# Patient Record
Sex: FEMALE | Race: WHITE | Employment: PART TIME | URBAN - METROPOLITAN AREA
[De-identification: names, ages, dates, MRNs, and addresses within clinical notes are randomized per-mention and may not be internally consistent; named-entity substitution may affect disease eponyms.]

---

## 2018-12-31 LAB
C TRACH DNA SPEC QL PROBE+SIG AMP: NORMAL
HBV SURFACE AG SERPL QL IA: NON REACTIVE
HIV 1+2 AB+HIV1 P24 AG SERPL QL IA: NON REACTIVE
N GONORRHOEA DNA SPEC QL PROBE+SIG AMP: NORMAL
RUBELLA ABY IGG: NORMAL

## 2019-02-28 ENCOUNTER — OFFICE VISIT (OUTPATIENT)
Dept: MATERNAL FETAL MEDICINE | Facility: CLINIC | Age: 29
End: 2019-02-28
Attending: OBSTETRICS & GYNECOLOGY
Payer: COMMERCIAL

## 2019-02-28 ENCOUNTER — HOSPITAL ENCOUNTER (OUTPATIENT)
Dept: LAB | Facility: CLINIC | Age: 29
End: 2019-02-28
Attending: OBSTETRICS & GYNECOLOGY
Payer: COMMERCIAL

## 2019-02-28 ENCOUNTER — HOSPITAL ENCOUNTER (OUTPATIENT)
Dept: ULTRASOUND IMAGING | Facility: CLINIC | Age: 29
Discharge: HOME | End: 2019-02-28
Attending: OBSTETRICS & GYNECOLOGY | Admitting: OBSTETRICS & GYNECOLOGY
Payer: COMMERCIAL

## 2019-02-28 ENCOUNTER — PRE VISIT (OUTPATIENT)
Dept: MATERNAL FETAL MEDICINE | Facility: CLINIC | Age: 29
End: 2019-02-28

## 2019-02-28 DIAGNOSIS — O26.90 PREGNANCY RELATED CONDITION, ANTEPARTUM: ICD-10-CM

## 2019-02-28 DIAGNOSIS — O28.3 ABNORMAL PRENATAL ULTRASOUND: Primary | ICD-10-CM

## 2019-02-28 DIAGNOSIS — O35.EXX0 PYELECTASIS OF FETUS ON PRENATAL ULTRASOUND: ICD-10-CM

## 2019-02-28 DIAGNOSIS — O28.3 ABNORMAL PRENATAL ULTRASOUND: ICD-10-CM

## 2019-02-28 PROCEDURE — 76817 TRANSVAGINAL US OBSTETRIC: CPT | Performed by: OBSTETRICS & GYNECOLOGY

## 2019-02-28 PROCEDURE — 36415 COLL VENOUS BLD VENIPUNCTURE: CPT | Performed by: OBSTETRICS & GYNECOLOGY

## 2019-02-28 PROCEDURE — 40000072 ZZH STATISTIC GENETIC COUNSELING, < 16 MIN: Mod: ZF | Performed by: GENETIC COUNSELOR, MS

## 2019-02-28 PROCEDURE — 40000791 ZZHCL STATISTIC VERIFI PRENATAL TRISOMY 21,18,13: Performed by: OBSTETRICS & GYNECOLOGY

## 2019-02-28 PROCEDURE — 76811 OB US DETAILED SNGL FETUS: CPT

## 2019-02-28 NOTE — PROGRESS NOTES
Please see ultrasound report under imaging tab for details on ultrasound performed today.    Gail Mancuso MD  , OB/GYN  Maternal-Fetal Medicine  [email or web address]  [phone] (Academic office)  [phone] (Pager)

## 2019-02-28 NOTE — PROGRESS NOTES
I met with Nunu and her partner today at the request of Dr. Gail Mancuso due to the unilateral pyelectasis seen on her comprehensive ultrasound today. We discussed non-invasive prenatal screening and amniocentesis. Nunu currently declines invasive testing and would like to proceed with non-invasive prenatal screening via [facility] with [facility]. She will be informed of these results in approximately 7-10 days. She requested that a detailed voice message be left at [phone] if she is unavailable. They declined sex chromosome analysis.     Time spent: 15 minutes     Yamilet Macias MS, [facility]  Maternal Fetal Medicine  [facility]  Ph: [phone]  [email or web address]

## 2019-03-06 ENCOUNTER — TELEPHONE (OUTPATIENT)
Dept: MATERNAL FETAL MEDICINE | Facility: CLINIC | Age: 29
End: 2019-03-06

## 2019-03-06 NOTE — TELEPHONE ENCOUNTER
"3/6/2019    I called Nunu to discuss her normal \"Innatal\" cell-free fetal DNA screening results.  Results came back negative for chromosome abnormalities in chromosomes 21, 18, & 13, as well as the sex chromosomes.  These normal results suggest the likelihood of fetal Down syndrome, trisomy 13, or trisomy 18 is very low. Sex chromosomes were not analyzed.    Discussed high detection rates for fetal Down syndrome, trisomies 13 and 18, and fetal sex chromosome abnormalities; however, not 100%. While this test is a highly accurate screen, it does not replace the diagnostic capabilities of standard prenatal diagnostic tests such as amniocentesis and CVS. Nunu indicated her understanding and currently declines prenatal diagnosis.     Plan:  A follow-up ultrasound has been scheduled within [facility] for 3/28.        Yamilet Macias MS, [facility]  Licensed Genetic Counselor  Phone: [phone]  Pager: [phone]  "

## 2019-03-08 LAB — LAB SCANNED RESULT: NORMAL

## 2019-03-28 ENCOUNTER — OFFICE VISIT (OUTPATIENT)
Dept: MATERNAL FETAL MEDICINE | Facility: CLINIC | Age: 29
End: 2019-03-28
Attending: OBSTETRICS & GYNECOLOGY
Payer: COMMERCIAL

## 2019-03-28 ENCOUNTER — HOSPITAL ENCOUNTER (OUTPATIENT)
Dept: ULTRASOUND IMAGING | Facility: CLINIC | Age: 29
Discharge: HOME | End: 2019-03-28
Attending: OBSTETRICS & GYNECOLOGY | Admitting: OBSTETRICS & GYNECOLOGY
Payer: COMMERCIAL

## 2019-03-28 DIAGNOSIS — O35.EXX0 PYELECTASIS OF FETUS ON PRENATAL ULTRASOUND: ICD-10-CM

## 2019-03-28 PROCEDURE — 76816 OB US FOLLOW-UP PER FETUS: CPT

## 2019-03-29 NOTE — PROGRESS NOTES
"Please see \"Imaging\" tab under \"Chart Review\" for details of today's US.    Flavia Flores, DO    "

## 2019-04-16 ENCOUNTER — TRANSFERRED RECORDS (OUTPATIENT)
Dept: HEALTH INFORMATION MANAGEMENT | Facility: CLINIC | Age: 29
End: 2019-04-16

## 2019-04-22 LAB — GLU GEST SCREEN 1HR 50G: 120

## 2019-04-29 ENCOUNTER — OFFICE VISIT (OUTPATIENT)
Dept: MATERNAL FETAL MEDICINE | Facility: CLINIC | Age: 29
End: 2019-04-29
Attending: OBSTETRICS & GYNECOLOGY
Payer: COMMERCIAL

## 2019-04-29 ENCOUNTER — HOSPITAL ENCOUNTER (OUTPATIENT)
Dept: ULTRASOUND IMAGING | Facility: CLINIC | Age: 29
Discharge: HOME | End: 2019-04-29
Attending: OBSTETRICS & GYNECOLOGY | Admitting: OBSTETRICS & GYNECOLOGY
Payer: COMMERCIAL

## 2019-04-29 DIAGNOSIS — O43.123 VELAMENTOUS INSERTION OF UMBILICAL CORD IN THIRD TRIMESTER: Primary | ICD-10-CM

## 2019-04-29 PROCEDURE — 76816 OB US FOLLOW-UP PER FETUS: CPT

## 2019-04-29 NOTE — PROGRESS NOTES
Please see full imaging report from [facility] program under imaging tab.    Findings reviewed with Nunu and her  today. Findings are improved from her last visit. As she approaches the potential admission EGA of 30-32 weeks, I recommend re-evaluating in 2 weeks, including a possible TVUS, to assess whether a true vasa previa remains (appears less likely today). If both vessels are > 2 cm from the os, she may not require hospitalization, and may not even require early term or late  delivery,  section, or even delivery at a tertiary care center. At this time I recommend ongoing US surveillance (follow up scheduled in two weeks with MFM) to determine next steps.     Margret Yang MD  Maternal Fetal Medicine

## 2019-05-13 ENCOUNTER — OFFICE VISIT (OUTPATIENT)
Dept: MATERNAL FETAL MEDICINE | Facility: CLINIC | Age: 29
End: 2019-05-13
Attending: OBSTETRICS & GYNECOLOGY
Payer: COMMERCIAL

## 2019-05-13 ENCOUNTER — HOSPITAL ENCOUNTER (OUTPATIENT)
Dept: ULTRASOUND IMAGING | Facility: CLINIC | Age: 29
Discharge: HOME | End: 2019-05-13
Attending: OBSTETRICS & GYNECOLOGY | Admitting: OBSTETRICS & GYNECOLOGY
Payer: COMMERCIAL

## 2019-05-13 DIAGNOSIS — O35.9XX0 FETAL ABNORMALITY AFFECTING MANAGEMENT OF MOTHER, SINGLE OR UNSPECIFIED FETUS: ICD-10-CM

## 2019-05-13 DIAGNOSIS — O43.123 VELAMENTOUS INSERTION OF UMBILICAL CORD IN THIRD TRIMESTER: ICD-10-CM

## 2019-05-13 DIAGNOSIS — O43.123 VELAMENTOUS INSERTION OF UMBILICAL CORD IN THIRD TRIMESTER: Primary | ICD-10-CM

## 2019-05-13 PROCEDURE — 76816 OB US FOLLOW-UP PER FETUS: CPT

## 2019-05-13 NOTE — PROGRESS NOTES
Please see full imaging report from [facility] program under imaging tab.    Findings reviewed with Nunu and her , including improved findings with regards to the placental vessels near the cervix (I do not recommend hospitalization at this time, may not even need  section for this induction), and the progression of the cardiac outflows to an abnormal appearance. Fetal echo referral is made today to pediatric cardiology and they will see her in two weeks (earliest available). They are aware that depending on echo findings, intended delivery hospital may need to change. This will be determined after her fetal echo by MFM. Office of Dr. Acevedo notified of today's findings (Dr. Acevedo in surgery), and we will follow up with him after fetal echo. Follow up MFM US in four weeks regardless to examine placental vessels and fetal growth.     Margret Yang MD  Maternal Fetal Medicine

## 2019-05-29 ENCOUNTER — OFFICE VISIT (OUTPATIENT)
Dept: CARDIOLOGY | Facility: CLINIC | Age: 29
End: 2019-05-29
Payer: COMMERCIAL

## 2019-05-29 ENCOUNTER — HOSPITAL ENCOUNTER (OUTPATIENT)
Dept: CARDIOLOGY | Facility: CLINIC | Age: 29
Discharge: HOME | End: 2019-05-29
Attending: OBSTETRICS & GYNECOLOGY | Admitting: OBSTETRICS & GYNECOLOGY
Payer: COMMERCIAL

## 2019-05-29 DIAGNOSIS — O35.9XX0 FETAL ABNORMALITY AFFECTING MANAGEMENT OF MOTHER, SINGLE OR UNSPECIFIED FETUS: Primary | ICD-10-CM

## 2019-05-29 DIAGNOSIS — O43.123 VELAMENTOUS INSERTION OF UMBILICAL CORD IN THIRD TRIMESTER: ICD-10-CM

## 2019-05-29 DIAGNOSIS — O35.BXX0 ANOMALY OF HEART OF FETUS AFFECTING PREGNANCY, ANTEPARTUM, SINGLE OR UNSPECIFIED FETUS: Primary | ICD-10-CM

## 2019-05-29 PROCEDURE — 76825 ECHO EXAM OF FETAL HEART: CPT

## 2019-05-29 NOTE — PROGRESS NOTES
Fetal Cardiology Consult    Date of Visit:   2019  Gestational Age: 32 weeks  Due Date:  2019  Delivery:  [facility]      Dear Dr. Carlson    I had the opportunity to meet with Nunu and her partner today for a Fetal Cardiology Consult and Fetal Echocardiography.    Fetal Echo demonstrated right heart enlargement and aortic isthmus hypoplasia ( juxtaductal coarctation). Normal ventricular function. HR was regular. No hydrops.    I have reviewed the Fetal Echo findings.  I drew a picture of a normal heart and compared it to that of coarctation of aorta.  I discussed the anatomy, physiology and intervention needed after birth.  I also have reviewed the length of stay.    The parents had appropriate questions. I did my best to answer their questions.    Plan:    No follow-up is needed.    Delivery at [facility]    PGE 1 postnatally if there is poor systemic perfusion.    Plan for post- echo after delivery    Thank you for allowing me to participate in Nunu's care.  Feel free to contact me with questions.    I spend 20 minutes counseling the patient about her fetal echocardiogram findings. All of this time was face to face.    Dr Claire Choi  Pediatric Cardiologist  Director, Fetal Cardiology  [facility]  Phone [phone]

## 2019-06-03 ENCOUNTER — MEDICAL CORRESPONDENCE (OUTPATIENT)
Dept: HEALTH INFORMATION MANAGEMENT | Facility: CLINIC | Age: 29
End: 2019-06-03

## 2019-06-06 ENCOUNTER — TELEPHONE (OUTPATIENT)
Dept: MATERNAL FETAL MEDICINE | Facility: CLINIC | Age: 29
End: 2019-06-06

## 2019-06-06 NOTE — TELEPHONE ENCOUNTER
Phone call from pt stating she is having trouble sleeping at night and wondering if there is anything that she is able to take. This scribe went over medications that are safe to take that are in the handout. Also talked to pt re comfort measures. Pt states understanding. Will try and will call back if she has further questions. Has a obv next week with MARCELL. Emelina Leonardo RN

## 2019-06-12 ENCOUNTER — OFFICE VISIT (OUTPATIENT)
Dept: MATERNAL FETAL MEDICINE | Facility: CLINIC | Age: 29
End: 2019-06-12
Attending: OBSTETRICS & GYNECOLOGY
Payer: COMMERCIAL

## 2019-06-12 ENCOUNTER — HOSPITAL ENCOUNTER (OUTPATIENT)
Dept: ULTRASOUND IMAGING | Facility: CLINIC | Age: 29
Discharge: HOME | End: 2019-06-12
Attending: OBSTETRICS & GYNECOLOGY | Admitting: OBSTETRICS & GYNECOLOGY
Payer: COMMERCIAL

## 2019-06-12 VITALS
RESPIRATION RATE: 20 BRPM | OXYGEN SATURATION: 100 % | WEIGHT: 173.2 LBS | HEART RATE: 91 BPM | DIASTOLIC BLOOD PRESSURE: 76 MMHG | SYSTOLIC BLOOD PRESSURE: 116 MMHG

## 2019-06-12 DIAGNOSIS — O35.9XX0 FETAL ABNORMALITY AFFECTING MANAGEMENT OF MOTHER, SINGLE OR UNSPECIFIED FETUS: ICD-10-CM

## 2019-06-12 DIAGNOSIS — O43.123 VELAMENTOUS INSERTION OF UMBILICAL CORD IN THIRD TRIMESTER: Primary | ICD-10-CM

## 2019-06-12 DIAGNOSIS — O43.123 VELAMENTOUS INSERTION OF UMBILICAL CORD IN THIRD TRIMESTER: ICD-10-CM

## 2019-06-12 PROCEDURE — 76816 OB US FOLLOW-UP PER FETUS: CPT

## 2019-06-12 PROCEDURE — 76817 TRANSVAGINAL US OBSTETRIC: CPT | Performed by: OBSTETRICS & GYNECOLOGY

## 2019-06-12 PROCEDURE — G0463 HOSPITAL OUTPT CLINIC VISIT: HCPCS | Mod: 25,ZF

## 2019-06-12 RX ORDER — OMEGA-3/DHA/EPA/FISH OIL 60 MG-90MG
1 CAPSULE ORAL
Status: ON HOLD | COMMUNITY
End: 2019-07-15

## 2019-06-12 ASSESSMENT — PAIN SCALES - GENERAL: PAINLEVEL: NO PAIN (0)

## 2019-06-12 NOTE — NURSING NOTE
Nunu here for f/u Obv, f/u comp, NICU/SW visit due to preg c/b vasa previa-resolved now.  Patient reports +FM, denies ctx, denies SROM, and denies vag bleeding.  Dr. Mullins in to talk with patient.  Patient would like IOL planned as she lives about an hour from the hospital. Patient scheduled for IOL 7/16 at 10:00 am. [facility]place and NICU notified. Patient left amb and stable.  Patient to have weekly OBV and f/u comp in 3 weeks. Gail Simon RN

## 2019-06-12 NOTE — NURSING NOTE
Nunu was here today for a neonatology consult because of her pregnancy being diagnosed with fetal right heart enlargement. Please see Dr. Villalobos's documentation of that discussion. This consult was followed by a tour of the NICU.  Gail Simon RN

## 2019-06-12 NOTE — PROGRESS NOTES
S: Patient had prenatal visit today.    No new complaints of vaginal bleeding, loss of fluid, contractions or pelvic pressure.  Patient reports feeling fetal movements.    O: Abdomen is soft and non-tender. FH consistent with dates. FHT present.  /76   Pulse 91   Resp 20   Wt 78.6 kg (173 lb 3.2 oz)   LMP 10/07/2018   SpO2 100%     Ultrasound:  Comprehensive Obstetric Follow Up Ultrasound  Indica??on Vasa previa  Method Transvaginal ultrasound examina??on was required to adequately complete the exam..  Transabdominal ultrasound examina??on. View: Sufficient  Pregnancy Mckeon pregnancy. Number of fetuses: 1  Da??ng Date Details Gest. age MEME  LMP 10/7/2018 35 w + 3 d 2019  Prior  assessment  2018 GA: 7 w + 5 d 34 w + 5 d 2019  U/S 2019 based upon AC, BPD, Femur, HC 34 w + 4 d 2019  Assigned  da??ng  Da??ng performed on 2019, based on the  prior assessment (on 2018)  34 w + 5 d 2019  General  Evalua??on  Cardiac ac??vity present.  bpm.  Fetal movements present.  Presenta??on cephalic.  Placenta  posterior, no placenta previa  Umbilical cord 3 vessel cord, velamentous cord inser??on, no longer a vasa previa .  Amnio??c fluid Amount of AF: Polyhydramnios. MVP 8.1 cm. TREVOR 24.1 cm. Q1 7.0 cm, Q2 6.4  cm, Q3 6.0 cm, Q4 4.8 cm.  Fetal Biometry Main Fetal Biometry:  BPD 84.0 mm 33w 6d Hadlock  .9 mm 36w 6d Nicolaides  .8 mm 35w 6d Hadlock  Cerebellum tr 45.6 mm -/- Nicolaides  .3 mm 33w 6d Hadlock  Femur 67.1 mm 34w 4d Hadlock  Humerus 59.4 mm 34w 3d Abad  Fetal Weight Calcula??on:  Page 2 of 3 for report of pa??ent ROSANNE GÓMEZ,  1990  EFW 2,378 g 36% Pop  EFW (lb,oz) 5 lb 4 oz  EFW by Hadlock (BPD-HC-AC-FL)  Head / Face / Neck Biometry:   5.6 mm  CM 5.3 mm  Fetal Anatomy Abdomen Le?? kidney: There dila??on of the renal pelvis without dila??on of the calyces.  Parenchyma is of normal echogenicity and thickness. (UTD A1:  Low Risk)  The following structures appear abnormal:  Heart / ThoraxAor??c arch view. 3-vessel-trachea view.  The following structures appear normal:  Head / Neck Cranium. Head size. Head shape. Lateral ventricles. Midline falx. Cavum sep??  pellucidi. Cerebellum. Cisterna magna. Thalami.  Face Lips. Nose.  Heart / Thorax4-chamber view. RVOT view. LVOT view.  Abdomen Stomach. Bladder.  Spine Cervical spine. Thoracic spine. Lumbar spine. Sacral spine.  The following structures were documented previously:  Face Profile.  Heart / ThoraxDiaphragm.  Gender: male.  Maternal  Structures  Cervix Visualized  Approach - Transvaginal: Cervical length 41.8 mm  Right Ovary Not examined  Le?? Ovary Not examined  Impression Pa??ent here for a fetal growth scan secondary to a diagnosis of velamentous cord inser??on  and history of vasa previa in this pregnancy. She is at 34w5d gesta??onal age.  Ac??ve single fetus with behavior appropriate for gesta??onal age.  Appropriate interval fetal growth.  Es??mated fetal weight is appropriate for gesta??onal age.  None of the anomalies commonly detected by ultrasound were evident in the limited fetal  anatomic survey described above.  Normal amnio??c fluid volume.  Transvaginal and transabdominal views of the lower uterus and cervix show complete  resolu??on of the vasa previa. The placenta a??aches to the wall of the uterus in the posterior  right area and the vessels ascent through the membranes to the posterior placenta, consistent  with a velamentous cord inser??on.    Assessment:   1.  SIUP at 34 w 5 d.  2. Fetal aortic coarctation  3. Velamentous cord insertion    We discussed the findings on today's ultrasound with the patient. The  resolution of the vasa previa is reassuring and would allow for the pregnancy to continue until the 39 the week. This is the best option since the fetus has been diagnosed with aortic coarctation and will benefit from full term delivery.        Recomemndations :    1.IOL of labor has been scheduled for 39 weeks and 4 days.    2. Continue  surveillance as previously recommended with growth scan in 4 weeks and weekly prenatal visits.    I spent a total of 10 minutes face-to-face with this patient counseling and coordinating care as described above. More than 50% of the time was in coordination of care and discussion of management of care.  A copy of this consultation is being faxed to your office.    Thank you for the opportunity to participate in the care of this patient.  If you have questions regarding today's evaluation or if we can be of further service, please contact the Maternal-Fetal Medicine Center.    Sumit Mullins M.D.  Maternal Fetal Medicine

## 2019-06-13 ENCOUNTER — TELEPHONE (OUTPATIENT)
Dept: MATERNAL FETAL MEDICINE | Facility: CLINIC | Age: 29
End: 2019-06-13

## 2019-06-13 ENCOUNTER — TRANSFERRED RECORDS (OUTPATIENT)
Dept: HEALTH INFORMATION MANAGEMENT | Facility: CLINIC | Age: 29
End: 2019-06-13

## 2019-06-13 NOTE — TELEPHONE ENCOUNTER
Nunu called stating she was getting her nails done in a salon and fainted. She was transported via ambulance to [facility]. She reports she had an EKG, ECHO, Fetal US and blood work, all WNL. Joann denies any LOF or bleeding and states she is feeling better now and has +FM. Dr. Carlson notified and states pt is fine to f/u as scheduled on 6/19. Instructed pt to call [facility] again with any questions or concerns. Danger signs reviewed.   Barbara Reyes RN

## 2019-06-17 NOTE — PROGRESS NOTES
Visit Date:   2019      I had the opportunity to meet with Ms. Nunu Trimble at the Maternal Fetal Medicine Clinic at the [facility] at the request of Dr. Flavia Flores.  Ms. Trimble is currently 34 weeks and 5 days pregnant with a fetus diagnosed with right heart enlargement and aortic isthmus hypoplasia with juxtaductal coarctation as well as left hydronephrosis.  She had history of suspected vasa previa that was ruled out at this visit.  I had the opportunity to review with Ms. Trimble and her partner, Neal, the expected  course for their infant after arrival.  We discussed the presence of a  resuscitation team in her delivery and the provision of cardiac and respiratory support if needed.  I described the process of admission to the  Intensive Care Unit and the probable placement of umbilical arterial and venous catheters for medication administration and close hemodynamic monitoring.      We reviewed the Pediatric Cardiology team and involvement that would take place for their infant.  They understand that echocardiogram will be obtained shortly after birth and further imaging as needed including potential for CT angiography as needed.  We discussed Cardiac and Cardiac Surgery consultations that would be obtained.  I also described the other monitoring that would take place for other organ systems prior to any cardiac repair.        I had the opportunity to review the basic structure of the NICU team and was able to invite the family for medical rounds daily.  We described the process for obtaining daily updates.  I also described the other layers of support present in the NICU, including the maternal child health.  social work team, lactation consultation, occupational therapy.  At the conclusion of our visit, a tour was provided of the NICU will be provided of the Cardiac Intensive Care Unit prior to delivery.      We look forward to caring  for the infant of Ms. Rosanne Trimble in the  Intensive Care Unit at the [facility].  Please do not hesitate to contact me if I can be of further assistance.      Total time of visit 30 minutes with 100% of the time in direct patient consultation.         Sincerely,      KELLEY TYLER MD             D: 2019   T: 2019   MT: PK      Name:     ROSANNE TRIMBLE   MRN:      [id number]        Account:      [id number]   :      1990           Visit Date:   2019      Document: [id number]       cc: Armaan Acevedo MD

## 2019-06-19 ENCOUNTER — OFFICE VISIT (OUTPATIENT)
Dept: MATERNAL FETAL MEDICINE | Facility: CLINIC | Age: 29
End: 2019-06-19
Attending: OBSTETRICS & GYNECOLOGY
Payer: COMMERCIAL

## 2019-06-19 VITALS
WEIGHT: 177.1 LBS | DIASTOLIC BLOOD PRESSURE: 81 MMHG | OXYGEN SATURATION: 99 % | SYSTOLIC BLOOD PRESSURE: 125 MMHG | RESPIRATION RATE: 18 BRPM | HEART RATE: 95 BPM

## 2019-06-19 DIAGNOSIS — O35.9XX0 FETAL ABNORMALITY AFFECTING MANAGEMENT OF MOTHER, SINGLE OR UNSPECIFIED FETUS: ICD-10-CM

## 2019-06-19 PROCEDURE — G0463 HOSPITAL OUTPT CLINIC VISIT: HCPCS | Mod: ZF

## 2019-06-19 PROCEDURE — 87653 STREP B DNA AMP PROBE: CPT | Performed by: OBSTETRICS & GYNECOLOGY

## 2019-06-19 ASSESSMENT — PATIENT HEALTH QUESTIONNAIRE - PHQ9: SUM OF ALL RESPONSES TO PHQ QUESTIONS 1-9: 2

## 2019-06-19 ASSESSMENT — PAIN SCALES - GENERAL: PAINLEVEL: NO PAIN (0)

## 2019-06-19 NOTE — NURSING NOTE
Nunu here for f/u OB visit at 35w5d gestation due to preg c/b R Fetal heart enlargement and aortic isthmus hypoplasia. Pt reports + FM, denies  LOF/Bleeding/change in discharge/HA/vision changes/chest pains/edema. Nunu has had episodes of feeling SOB (VS WNL) and had one episode of fainting last week (see telephone note), reviewed common discomforts of pregnancy and comfort measures. Pt also states she has intermittent cramping, PTL signs reviewed. OB visit done, see flowsheets. Dr. Benavides in to meet with pt (see visit note). GBS done today, sent to lab. Pt will continue weekly OBV and f/u US in 2 weeks. Writer requested records from [facility] from encounter following syncope epidsode and walked pt to NICU for CV ICU tour with Dr. Villalobos. Discharged stable.  Barbara Reyes RN

## 2019-06-19 NOTE — PROGRESS NOTES
MFM OBV    S: No labor complaints. Good FM.     O:  /81 (BP Location: Left arm, Patient Position: Sitting, Cuff Size: Adult Regular)   Pulse 95   Resp 18   Wt 80.3 kg (177 lb 1.6 oz)   LMP 10/07/2018   SpO2 99%     Abd - NT    Fetus with aortic isthmus hypoplasia and UTD A1 - Plan fetal echo, US and OBV in 3 weeks.    Quan Benavides MD  Maternal-Fetal Medicine

## 2019-06-20 LAB
GP B STREP DNA SPEC QL NAA+PROBE: NEGATIVE
SPECIMEN SOURCE: NORMAL

## 2019-06-26 ENCOUNTER — OFFICE VISIT (OUTPATIENT)
Dept: MATERNAL FETAL MEDICINE | Facility: CLINIC | Age: 29
End: 2019-06-26
Attending: OBSTETRICS & GYNECOLOGY
Payer: COMMERCIAL

## 2019-06-26 VITALS
DIASTOLIC BLOOD PRESSURE: 76 MMHG | WEIGHT: 178 LBS | RESPIRATION RATE: 18 BRPM | HEART RATE: 96 BPM | SYSTOLIC BLOOD PRESSURE: 116 MMHG

## 2019-06-26 DIAGNOSIS — O35.9XX0 FETAL ABNORMALITY AFFECTING MANAGEMENT OF MOTHER, SINGLE OR UNSPECIFIED FETUS: Primary | ICD-10-CM

## 2019-06-26 PROCEDURE — G0463 HOSPITAL OUTPT CLINIC VISIT: HCPCS | Mod: ZF

## 2019-06-26 NOTE — PROGRESS NOTES
"Maternal-Fetal Medicine Prenatal Visit    Nunu Trimble MRN# [id number]   Age: 29 year old  Estimated Date of Delivery: 2019            Gestational age: 36w5d YOB: 1990             Subjective:        Pt denies LOF, VB, CTX.  Reports + FM.     Had a brief syncopal episode at \[facility]"" recently.  Taken to [facility]- work up normal.  Patient feels well now.  Also c/o \"kaci horse\" calf cramps at night.  Getting acupuncture and just started magnesium supplements and eating more bananas.      Patient is worried about IOL.   She had her mind set for a c/s due to vasa previa.  Now that the vasa previa has resolved, she is worried about a vaginal delivery.  She states she has a low pain tolerance and her mom had to have c/s due to her stature.            Objective:        /76 (BP Location: Right arm, Patient Position: Chair, Cuff Size: Adult Regular)   Pulse 96   Resp 18   Wt 80.7 kg (178 lb)   LMP 10/07/2018        No results found for this or any previous visit (from the past 24 hour(s)).    Labs:    See media tab for results    GBS Status:   Lab Results   Component Value Date    GBS Negative 2019              Gen: NAD, alert, comfortable       Abdomen: Gravid, Soft, Non-tender       Ext: trace edema    FHTs: wnl today          Assessment/Plan:        29 year old y.o.  at 36w5d        1) Inferior velamentous cord insertion with resolved vasa previa.  Repeat growth US and TVUS next week.  Patient is still concerned about having a vaginal delivery.   Will further address mode of delivery after next ultrasound.        2) Fetal aortic arch hypoplasia and possible coarctation.  Baby to go to NICU for evaluation with Echo and Peds Cards consultation.                Attestation:   The patient was seen for an established outpatient visit.  I spent a total of 15 minutes face to face with Nunu Ravindernichole during today's office visit.  Over (>50%) was spent on counseling " the patient and/or coordinating care regarding fetal aortic hypoplasia.        Flavia Flores, DO  Maternal-Fetal Medicine  June 26, 2019

## 2019-06-26 NOTE — NURSING NOTE
Nunu seen in clinic today for OBV at 36w5d gestation for a fetus with enlarged right heart (see report/notes). VSS. Pt denies bldg/lof/change in discharge/headache/vision changes/chest pain/edema. Complains of restless legs when sleeping. Is seeing an acupuncturist who recommended magnesium, which she has been taking. She is also eating lots of fruit, especially bananas. Nunu has random seconds of cramping which goes away quickly and happens every couple of days. We discussed her previous fainting episode and states she hasn't fainted anymore but still has moments where she feels like she may faint. She tries to lean back and take a couple breaths and eat a snack and feels like the feeling resolves. She had questions about the process of labor, which were discussed. Dr. Flores met with pt and discussed POC. Plan for OBV and F/U comp with TV in 1 week. Pt discharged stable and ambulatory.

## 2019-07-03 ENCOUNTER — TELEPHONE (OUTPATIENT)
Dept: MATERNAL FETAL MEDICINE | Facility: CLINIC | Age: 29
End: 2019-07-03

## 2019-07-03 ENCOUNTER — HOSPITAL ENCOUNTER (OUTPATIENT)
Dept: ULTRASOUND IMAGING | Facility: CLINIC | Age: 29
Discharge: HOME | End: 2019-07-03
Attending: OBSTETRICS & GYNECOLOGY | Admitting: OBSTETRICS & GYNECOLOGY
Payer: COMMERCIAL

## 2019-07-03 ENCOUNTER — OFFICE VISIT (OUTPATIENT)
Dept: MATERNAL FETAL MEDICINE | Facility: CLINIC | Age: 29
End: 2019-07-03
Attending: OBSTETRICS & GYNECOLOGY
Payer: COMMERCIAL

## 2019-07-03 VITALS
SYSTOLIC BLOOD PRESSURE: 114 MMHG | WEIGHT: 178.8 LBS | DIASTOLIC BLOOD PRESSURE: 78 MMHG | RESPIRATION RATE: 18 BRPM | OXYGEN SATURATION: 100 % | HEART RATE: 81 BPM

## 2019-07-03 DIAGNOSIS — O43.123 VELAMENTOUS INSERTION OF UMBILICAL CORD IN THIRD TRIMESTER: ICD-10-CM

## 2019-07-03 DIAGNOSIS — O09.93 SUPERVISION OF HIGH RISK PREGNANCY IN THIRD TRIMESTER: Primary | ICD-10-CM

## 2019-07-03 DIAGNOSIS — O35.9XX0 FETAL ABNORMALITY AFFECTING MANAGEMENT OF MOTHER, SINGLE OR UNSPECIFIED FETUS: ICD-10-CM

## 2019-07-03 LAB
ALBUMIN UR-MCNC: 10 MG/DL
APPEARANCE UR: CLEAR
BACTERIA #/AREA URNS HPF: ABNORMAL /HPF
BILIRUB UR QL STRIP: NEGATIVE
COLOR UR AUTO: YELLOW
GLUCOSE UR STRIP-MCNC: NEGATIVE MG/DL
HGB UR QL STRIP: NEGATIVE
HYALINE CASTS #/AREA URNS LPF: 1 /LPF (ref 0–2)
KETONES UR STRIP-MCNC: NEGATIVE MG/DL
LEUKOCYTE ESTERASE UR QL STRIP: NEGATIVE
MUCOUS THREADS #/AREA URNS LPF: PRESENT /LPF
NITRATE UR QL: NEGATIVE
PH UR STRIP: 6.5 PH (ref 5–7)
RBC #/AREA URNS AUTO: 1 /HPF (ref 0–2)
SOURCE: ABNORMAL
SP GR UR STRIP: 1.02 (ref 1–1.03)
SQUAMOUS #/AREA URNS AUTO: 1 /HPF (ref 0–1)
UROBILINOGEN UR STRIP-MCNC: NORMAL MG/DL (ref 0–2)
WBC #/AREA URNS AUTO: 1 /HPF (ref 0–5)

## 2019-07-03 PROCEDURE — G0463 HOSPITAL OUTPT CLINIC VISIT: HCPCS | Mod: 25,ZF

## 2019-07-03 PROCEDURE — 81001 URINALYSIS AUTO W/SCOPE: CPT | Performed by: OBSTETRICS & GYNECOLOGY

## 2019-07-03 PROCEDURE — 76816 OB US FOLLOW-UP PER FETUS: CPT

## 2019-07-03 PROCEDURE — 76817 TRANSVAGINAL US OBSTETRIC: CPT | Performed by: OBSTETRICS & GYNECOLOGY

## 2019-07-03 NOTE — PROGRESS NOTES
"Maternal-Fetal Medicine Prenatal Visit    Nunu Trimble MRN# [id number]   Age: 29 year old  Estimated Date of Delivery: Jul 19, 2019            Gestational age: 37w5d  YOB: 1990             Subjective:   Pt denies LOF, VB, CTX.  Reports + FM.  No signs/symptoms of labor reported.  Having some lower abdominal cramping, but not noticing contractions.      Nunu states she is overall feeling well.  No other syncopal or near syncopal episodes, although she states she noted one period of feeling sweaty after prolonged standing.        We reviewed US findings today (Please see \"Imaging\" tab under \"Chart Review\" for details of today's US).  She has decided to proceed with primary CS due to intermittent funic presentation related to low implantation of velamentous cord insertion.            Objective:        /78 (BP Location: Left arm, Patient Position: Chair)   Pulse 81   Resp 18   Wt 81.1 kg (178 lb 12.8 oz)   LMP 10/07/2018   SpO2 100%          Results for orders placed or performed in visit on 07/03/19 (from the past 24 hour(s))   Routine UA with micro reflex to culture   Result Value Ref Range    Color Urine Yellow     Appearance Urine Clear     Glucose Urine Negative NEG^Negative mg/dL    Bilirubin Urine Negative NEG^Negative    Ketones Urine Negative NEG^Negative mg/dL    Specific Gravity Urine 1.021 1.003 - 1.035    Blood Urine Negative NEG^Negative    pH Urine 6.5 5.0 - 7.0 pH    Protein Albumin Urine 10 (A) NEG^Negative mg/dL    Urobilinogen mg/dL Normal 0.0 - 2.0 mg/dL    Nitrite Urine Negative NEG^Negative    Leukocyte Esterase Urine Negative NEG^Negative    Source Midstream Urine     WBC Urine 1 0 - 5 /HPF    RBC Urine 1 0 - 2 /HPF    Bacteria Urine Few (A) NEG^Negative /HPF    Squamous Epithelial /HPF Urine 1 0 - 1 /HPF    Mucous Urine Present (A) NEG^Negative /LPF    Hyaline Casts 1 0 - 2 /LPF     Labs:    See media tab for results    GBS Status:   Lab Results   Component " Value Date    GBS Negative 2019          Gen: NAD, alert, comfortable       Abdomen: Gravid, Soft, Non-tender       Ext: trace edema          Assessment/Plan:        29 year old y.o.  at 37w5d         1) Inferior velamentous cord insertion with resolved vasa previa.  Intermittent funic presentation noted due to velamentous cord insertion with insertion site caudad to fetal head.  Patient planning CS, scheduled at 39 weeks due to suspected fetal congenital heart defect.  Signs/symptoms of labor reviewed and patient to present to L&D immediately if any signs symptoms of labor or ROM present due to risk for cord prolapse related to funic presentation.  Will check UA related to intermittent lower abdominal cramping today.  On US, cervix is long and closed.      RECOMMEND NO DELAYED CORD CLAMPING DUE TO RISK OF POSSIBLE TRANSECTION OF FETAL VESSEL COURSING FROM POSTERIOR PLACENTA TO ANTERIOR ASPECT OF PLACENTA (ON MATERNAL RIGHT)         2) Fetal aortic arch hypoplasia and possible coarctation.  Baby to go to NICU for evaluation with Echo and Peds Cards consultation.            Attestation:   The patient was seen for an established outpatient visit.  I spent a total of 15 minutes face to face with Nunu Trimble during today's office visit.  Over (>50%) was spent on counseling the patient and/or coordinating care regarding fetal aortic hypoplasia and intermittent funic presentation.    Mariajose Damon

## 2019-07-03 NOTE — NURSING NOTE
Nunu seen in clinic today for follow up growth ultrasound and OB visit at 37w5d gestation due to pregnancy c/b fetal CHD  (see report/notes). VSS. Pt denies bldg/lof/change in discharge/contractions/headache/vision changes/chest pain/SOB/edema. Pt reports + fetal movement. Reports occasional cramping. Dr. Damon met with pt and discussed POC. Plan to return to clinic next week for OB visit.  Plan primary c/section on Friday 7/12/19 at 39w0d due to fetal CHD and funic presentation. Time TBD. UA sent today. Writer will work on scheduling with [facility]. Pt discharged stable and ambulatory.       Gail Frey RN

## 2019-07-05 ENCOUNTER — TELEPHONE (OUTPATIENT)
Dept: MATERNAL FETAL MEDICINE | Facility: CLINIC | Age: 29
End: 2019-07-05

## 2019-07-05 DIAGNOSIS — O26.90 PREGNANCY RELATED CONDITION, ANTEPARTUM: Primary | ICD-10-CM

## 2019-07-05 NOTE — TELEPHONE ENCOUNTER
Nunu calling to inquire on scheduling of c/section.  Surgery as been scheduled for Friday 7/12/19 at 0830. Pt informed she will need to arrive to L&D by 0630. Will provide written material and soap at visit on 7/10/19.    Gail Frey RN

## 2019-07-10 ENCOUNTER — OFFICE VISIT (OUTPATIENT)
Dept: MATERNAL FETAL MEDICINE | Facility: CLINIC | Age: 29
End: 2019-07-10
Attending: OBSTETRICS & GYNECOLOGY
Payer: COMMERCIAL

## 2019-07-10 ENCOUNTER — HOSPITAL ENCOUNTER (OUTPATIENT)
Dept: ULTRASOUND IMAGING | Facility: CLINIC | Age: 29
Discharge: HOME | End: 2019-07-10
Attending: OBSTETRICS & GYNECOLOGY | Admitting: OBSTETRICS & GYNECOLOGY
Payer: COMMERCIAL

## 2019-07-10 VITALS
DIASTOLIC BLOOD PRESSURE: 85 MMHG | SYSTOLIC BLOOD PRESSURE: 115 MMHG | HEART RATE: 90 BPM | OXYGEN SATURATION: 99 % | RESPIRATION RATE: 18 BRPM | WEIGHT: 180 LBS

## 2019-07-10 DIAGNOSIS — O26.90 PREGNANCY RELATED CONDITION, ANTEPARTUM: ICD-10-CM

## 2019-07-10 DIAGNOSIS — O35.9XX0 FETAL ABNORMALITY AFFECTING MANAGEMENT OF MOTHER, SINGLE OR UNSPECIFIED FETUS: Primary | ICD-10-CM

## 2019-07-10 PROCEDURE — G0463 HOSPITAL OUTPT CLINIC VISIT: HCPCS | Mod: 25,ZF

## 2019-07-10 PROCEDURE — 76819 FETAL BIOPHYS PROFIL W/O NST: CPT

## 2019-07-10 ASSESSMENT — PAIN SCALES - GENERAL: PAINLEVEL: NO PAIN (0)

## 2019-07-10 NOTE — PROGRESS NOTES
Maternal-Fetal Medicine Prenatal Visit    Nunu Trimble MRN# [id number]   Age: 29 year old  Estimated Date of Delivery: 2019            Gestational age: 38w5d YOB: 1990             Subjective:        Pt denies LOF, VB, CTX.  Reports + FM    Ready for c/s on Friday. Reviewed preoperative instructions.          Objective:        /85 (BP Location: Left arm, Patient Position: Sitting, Cuff Size: Adult Regular)   Pulse 90   Resp 18   Wt 81.6 kg (180 lb)   LMP 10/07/2018   SpO2 99%          Results for orders placed or performed during the hospital encounter of 07/10/19 (from the past 24 hour(s))   [facility] BPP Single    Narrative            BPP  ---------------------------------------------------------------------------------------------------------  Pat. Name: NUNU TRIMBLE       Study Date:  07/10/2019 9:40am  Pat. NO:  [id number]        Referring  MD: HAL PEREZ  Site:  [facility]       Sonographer: Gem Karimi RDMS  :  1990        Age:   29  ---------------------------------------------------------------------------------------------------------    INDICATION  ---------------------------------------------------------------------------------------------------------  Assess if there is recurrence of fetal arrhythmia, re-evaluate TREVOR.      METHOD  ---------------------------------------------------------------------------------------------------------  Transabdominal ultrasound examination. View: Sufficient      PREGNANCY  ---------------------------------------------------------------------------------------------------------  [place] pregnancy. Number of fetuses: 1      DATING  ---------------------------------------------------------------------------------------------------------                                           Date                                Details                                                                                      Gest. age                       MEME  LMP                                  10/7/2018                                                                                                                         39 w + 3 d                     7/14/2019  Prior assessment               12/5/2018                         GA: 7 w + 5 d                                                                            38 w + 5 d                     7/19/2019  Assigned dating                  Dating performed on 05/13/2019, based on the prior assessment (on 12/5/2018)                    38 w + 5 d                     7/19/2019      GENERAL EVALUATION  ---------------------------------------------------------------------------------------------------------  Cardiac activity present.  bpm.  Fetal movements visualized.  Presentation cephalic.  Placenta posterior.  Umbilical cord previously studied.      AMNIOTIC FLUID ASSESSMENT  ---------------------------------------------------------------------------------------------------------  Amount of AF: normal  MVP 7.8 cm      BIOPHYSICAL PROFILE  ---------------------------------------------------------------------------------------------------------  2: Fetal breathing movements  2: Gross body movements  2: Fetal tone  2: Amniotic fluid volume  8/8 Biophysical profile score  Interpretation: normal      RECOMMENDATION  ---------------------------------------------------------------------------------------------------------  We discussed the findings on today's ultrasound with the patient.    See Epic notes for further details of today's visit.    The patient is scheduled for primary c/s due to velamentous CI with funic presentation on Friday. 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    Thank you for the opportunity to participate in the care of this patient. If you have questions regarding today's evaluation or if we can be of further service, please contact the  [facility].    **Fetal anomalies may be present but not detected**        Impression    IMPRESSION  ---------------------------------------------------------------------------------------------------------  1) Intrauterine pregnancy at 38 5/7 weeks gestational age.  2) The BPP is reassuring.  3) The amniotic fluid volume appeared normal.           Labs:   See Media for labs    GBS Status:   Lab Results   Component Value Date    GBS Negative 2019                   Gen:  NAD, alert, comfortable       Abdomen: Gravid, Soft, Non-tender       Ext: no edema          Assessment/Plan:        29 year old y.o.  at 38w5d        1) Inferior velamentous CI with funic presentation.  Previous vasa previa- now resolved- vessel posterior and anterior now > 3cm away from internal os.        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        3) Primary c/s on Friday.  Cord blood for SNP microarray.       4) GBS negative          Attestation:   The patient was seen for an established outpatient visit.  I spent a total of 15 minutes face to face with Nunu Trimble during today's office visit.  Over (>50%) was spent on counseling the patient and/or coordinating care regarding fetal anomaly.    Flavia Flores, DO  Maternal-Fetal Medicine  July 10, 2019

## 2019-07-11 ENCOUNTER — ANESTHESIA EVENT (OUTPATIENT)
Dept: OBGYN | Facility: CLINIC | Age: 29
End: 2019-07-11
Payer: COMMERCIAL

## 2019-07-11 ASSESSMENT — LIFESTYLE VARIABLES: TOBACCO_USE: 0

## 2019-07-11 ASSESSMENT — COPD QUESTIONNAIRES: COPD: 0

## 2019-07-11 NOTE — ANESTHESIA PREPROCEDURE EVALUATION
Anesthesia Pre-Procedure Evaluation    Patient: Nnuu Trimble   MRN:     [id number] Gender:   female   Age:    29 year old :      1990        Preoperative Diagnosis: Fetal Congenital Heart Disease   Procedure(s):  Primary Ceserean Section     No past medical history on file.   No past surgical history on file.       Anesthesia Evaluation     . Pt has had prior anesthetic. Type: General (History of davinci assisted laparoscopic surgery for lysis of adhesions w/o anesthesia record. )           ROS/MED HX    ENT/Pulmonary:  - neg pulmonary ROS    (-) tobacco use, asthma and COPD   Neurologic:  - neg neurologic ROS     Cardiovascular: Comment: Recent Syncopal Episode - evaluation normal at OSH. No EKG on file.     (+) ----. : . . . :. . Previous cardiac testing Echodate:2018results:Normal per patient report, done at OSH.date: results:ECG reviewed date: results:Normal per patient report, done at OSH. date: results:         (-) hypertension   METS/Exercise Tolerance:  >4 METS   Hematologic:         Musculoskeletal:  - neg musculoskeletal ROS       GI/Hepatic:     (+) GERD       Renal/Genitourinary:  - ROS Renal section negative    (-) renal disease   Endo:  - neg endo ROS       Psychiatric:  - neg psychiatric ROS       Infectious Disease:  - neg infectious disease ROS       Malignancy:      - no malignancy   Other: Comment: 2019 H&P suggests DaVinci assisted laparoscopic surgery for lysis of adhesions in 2018.     Today's C/S for fetal congenital heart disease.    (+) Possibly pregnant                        PHYSICAL EXAM:   Mental Status/Neuro: A/A/O   Airway: Facies: Feasible  Mallampati: II  Mouth/Opening: Full  TM distance: > 6 cm  Neck ROM: Full   Respiratory: Auscultation: CTAB     Resp. Rate: Normal     Resp. Effort: Normal      CV: Rhythm: Regular  Rate: Age appropriate  Heart: Normal Sounds   Comments:      Dental: Normal                  No results found for: WBC, HGB, HCT, PLT,  CRP, SED, NA, POTASSIUM, CHLORIDE, CO2, BUN, CR, GLC, ROGELIO, PHOS, MAG, ALBUMIN, PROTTOTAL, ALT, AST, GGT, ALKPHOS, BILITOTAL, BILIDIRECT, LIPASE, AMYLASE, PADMINI, PTT, INR, FIBR, TSH, T4, T3, HCG, HCGS, CKTOTAL, CKMB, TROPN    Preop Vitals  BP Readings from Last 3 Encounters:   07/10/19 115/85   07/03/19 114/78   06/26/19 116/76    Pulse Readings from Last 3 Encounters:   07/10/19 90   07/03/19 81   06/26/19 96      Resp Readings from Last 3 Encounters:   07/10/19 18   07/03/19 18   06/26/19 18    SpO2 Readings from Last 3 Encounters:   07/10/19 99%   07/03/19 100%   06/19/19 99%      Temp Readings from Last 1 Encounters:   No data found for Temp    Ht Readings from Last 1 Encounters:   No data found for Ht      Wt Readings from Last 1 Encounters:   07/10/19 81.6 kg (180 lb)    There is no height or weight on file to calculate BMI.     LDA:            Assessment:   ASA SCORE: 2       Documentation: H&P complete; Preop Testing complete; Consents complete   Proceeding: Proceed without further delay  Tobacco Use:  NO Active use of Tobacco/UNKNOWN Tobacco use status     Plan:   Anes. Type:  Regional     RA Details:  Labor/OB Procedure; SS     RA-Location/Type: Spinal   Pre-Induction: None     Drips/Meds-Preparation: Phenylephrine; Oxytocin   Induction:  Not applicable   Airway: Native Airway   Access/Monitoring: PIV   Maintenance: N/a   Emergence: Not Applicable   Logistics: Observation/Admission     Postop Pain/Sedation Strategy:  Standard-Options: Block SS     PONV Management:  Adult Risk Factors: Female, Non-Smoker  Prevention: Ondansetron     CONSENT: Direct conversation   Plan and risks discussed with: Patient          Comments for Plan/Consent:  ECG prior to procedure, since the patient had syncopal episode once one month ago and all her test were done in OSH (negative cardiac tests per her report).                        Juvenal López DO on 7/11/2019 at 11:45 AM  Anesthesiology Resident PGY-2 / CA-1  Pager:  [phone]

## 2019-07-12 ENCOUNTER — HOSPITAL ENCOUNTER (INPATIENT)
Facility: CLINIC | Age: 29
LOS: 3 days | Discharge: HOME | End: 2019-07-15
Attending: OBSTETRICS & GYNECOLOGY | Admitting: OBSTETRICS & GYNECOLOGY
Payer: COMMERCIAL

## 2019-07-12 ENCOUNTER — ANESTHESIA (OUTPATIENT)
Dept: OBGYN | Facility: CLINIC | Age: 29
End: 2019-07-12
Payer: COMMERCIAL

## 2019-07-12 DIAGNOSIS — Z98.891 S/P CESAREAN SECTION: Primary | ICD-10-CM

## 2019-07-12 LAB
ABO + RH BLD: NORMAL
ABO + RH BLD: NORMAL
BLD GP AB SCN SERPL QL: NORMAL
BLOOD BANK CMNT PATIENT-IMP: NORMAL
ERYTHROCYTE [DISTWIDTH] IN BLOOD BY AUTOMATED COUNT: 13.4 % (ref 10–15)
HCT VFR BLD AUTO: 38 % (ref 35–47)
HGB BLD-MCNC: 12.5 G/DL (ref 11.7–15.7)
MCH RBC QN AUTO: 28.4 PG (ref 26.5–33)
MCHC RBC AUTO-ENTMCNC: 32.9 G/DL (ref 31.5–36.5)
MCV RBC AUTO: 86 FL (ref 78–100)
PLATELET # BLD AUTO: 357 10E9/L (ref 150–450)
RBC # BLD AUTO: 4.4 10E12/L (ref 3.8–5.2)
SPECIMEN EXP DATE BLD: NORMAL
T PALLIDUM AB SER QL: NONREACTIVE
WBC # BLD AUTO: 9.8 10E9/L (ref 4–11)

## 2019-07-12 PROCEDURE — 25000128 H RX IP 250 OP 636: Performed by: STUDENT IN AN ORGANIZED HEALTH CARE EDUCATION/TRAINING PROGRAM

## 2019-07-12 PROCEDURE — 88307 TISSUE EXAM BY PATHOLOGIST: CPT | Performed by: STUDENT IN AN ORGANIZED HEALTH CARE EDUCATION/TRAINING PROGRAM

## 2019-07-12 PROCEDURE — 36000057 ZZH SURGERY LEVEL 3 1ST 30 MIN - UMMC: Performed by: OBSTETRICS & GYNECOLOGY

## 2019-07-12 PROCEDURE — 86780 TREPONEMA PALLIDUM: CPT | Performed by: STUDENT IN AN ORGANIZED HEALTH CARE EDUCATION/TRAINING PROGRAM

## 2019-07-12 PROCEDURE — 37000009 ZZH ANESTHESIA TECHNICAL FEE, EACH ADDTL 15 MIN: Performed by: OBSTETRICS & GYNECOLOGY

## 2019-07-12 PROCEDURE — C9290 INJ, BUPIVACAINE LIPOSOME: HCPCS | Performed by: STUDENT IN AN ORGANIZED HEALTH CARE EDUCATION/TRAINING PROGRAM

## 2019-07-12 PROCEDURE — 27210794 ZZH OR GENERAL SUPPLY STERILE: Performed by: OBSTETRICS & GYNECOLOGY

## 2019-07-12 PROCEDURE — 40000170 ZZH STATISTIC PRE-PROCEDURE ASSESSMENT II: Performed by: OBSTETRICS & GYNECOLOGY

## 2019-07-12 PROCEDURE — 88307 TISSUE EXAM BY PATHOLOGIST: CPT | Mod: 26 | Performed by: STUDENT IN AN ORGANIZED HEALTH CARE EDUCATION/TRAINING PROGRAM

## 2019-07-12 PROCEDURE — 25800030 ZZH RX IP 258 OP 636: Performed by: STUDENT IN AN ORGANIZED HEALTH CARE EDUCATION/TRAINING PROGRAM

## 2019-07-12 PROCEDURE — 25000128 H RX IP 250 OP 636: Performed by: ANESTHESIOLOGY

## 2019-07-12 PROCEDURE — 71000014 ZZH RECOVERY PHASE 1 LEVEL 2 FIRST HR: Performed by: OBSTETRICS & GYNECOLOGY

## 2019-07-12 PROCEDURE — 27110028 ZZH OR GENERAL SUPPLY NON-STERILE: Performed by: OBSTETRICS & GYNECOLOGY

## 2019-07-12 PROCEDURE — 93005 ELECTROCARDIOGRAM TRACING: CPT

## 2019-07-12 PROCEDURE — 25000125 ZZHC RX 250: Performed by: STUDENT IN AN ORGANIZED HEALTH CARE EDUCATION/TRAINING PROGRAM

## 2019-07-12 PROCEDURE — 25000132 ZZH RX MED GY IP 250 OP 250 PS 637: Performed by: STUDENT IN AN ORGANIZED HEALTH CARE EDUCATION/TRAINING PROGRAM

## 2019-07-12 PROCEDURE — 71000015 ZZH RECOVERY PHASE 1 LEVEL 2 EA ADDTL HR: Performed by: OBSTETRICS & GYNECOLOGY

## 2019-07-12 PROCEDURE — 85027 COMPLETE CBC AUTOMATED: CPT | Performed by: STUDENT IN AN ORGANIZED HEALTH CARE EDUCATION/TRAINING PROGRAM

## 2019-07-12 PROCEDURE — 86901 BLOOD TYPING SEROLOGIC RH(D): CPT | Performed by: STUDENT IN AN ORGANIZED HEALTH CARE EDUCATION/TRAINING PROGRAM

## 2019-07-12 PROCEDURE — 36000059 ZZH SURGERY LEVEL 3 EA 15 ADDTL MIN UMMC: Performed by: OBSTETRICS & GYNECOLOGY

## 2019-07-12 PROCEDURE — 12000001 ZZH R&B MED SURG/OB UMMC

## 2019-07-12 PROCEDURE — 86850 RBC ANTIBODY SCREEN: CPT | Performed by: STUDENT IN AN ORGANIZED HEALTH CARE EDUCATION/TRAINING PROGRAM

## 2019-07-12 PROCEDURE — 86900 BLOOD TYPING SEROLOGIC ABO: CPT | Performed by: STUDENT IN AN ORGANIZED HEALTH CARE EDUCATION/TRAINING PROGRAM

## 2019-07-12 PROCEDURE — 37000008 ZZH ANESTHESIA TECHNICAL FEE, 1ST 30 MIN: Performed by: OBSTETRICS & GYNECOLOGY

## 2019-07-12 RX ORDER — MORPHINE SULFATE 1 MG/ML
INJECTION, SOLUTION EPIDURAL; INTRATHECAL; INTRAVENOUS PRN
Status: DISCONTINUED | OUTPATIENT
Start: 2019-07-12 | End: 2019-07-12

## 2019-07-12 RX ORDER — ONDANSETRON 4 MG/1
4 TABLET, ORALLY DISINTEGRATING ORAL EVERY 30 MIN PRN
Status: DISCONTINUED | OUTPATIENT
Start: 2019-07-12 | End: 2019-07-15 | Stop reason: HOSPADM

## 2019-07-12 RX ORDER — BISACODYL 10 MG
10 SUPPOSITORY, RECTAL RECTAL DAILY PRN
Status: DISCONTINUED | OUTPATIENT
Start: 2019-07-14 | End: 2019-07-15 | Stop reason: HOSPADM

## 2019-07-12 RX ORDER — FENTANYL CITRATE 50 UG/ML
10 INJECTION, SOLUTION INTRAMUSCULAR; INTRAVENOUS ONCE
Status: DISCONTINUED | OUTPATIENT
Start: 2019-07-12 | End: 2019-07-12

## 2019-07-12 RX ORDER — EPHEDRINE SULFATE 50 MG/ML
5 INJECTION, SOLUTION INTRAMUSCULAR; INTRAVENOUS; SUBCUTANEOUS
Status: DISCONTINUED | OUTPATIENT
Start: 2019-07-12 | End: 2019-07-12

## 2019-07-12 RX ORDER — ONDANSETRON 2 MG/ML
4 INJECTION INTRAMUSCULAR; INTRAVENOUS EVERY 30 MIN PRN
Status: DISCONTINUED | OUTPATIENT
Start: 2019-07-12 | End: 2019-07-12

## 2019-07-12 RX ORDER — BUPIVACAINE HYDROCHLORIDE 7.5 MG/ML
1.6 INJECTION, SOLUTION EPIDURAL; RETROBULBAR ONCE
Status: DISCONTINUED | OUTPATIENT
Start: 2019-07-12 | End: 2019-07-12

## 2019-07-12 RX ORDER — OXYTOCIN/0.9 % SODIUM CHLORIDE 30/500 ML
PLASTIC BAG, INJECTION (ML) INTRAVENOUS CONTINUOUS PRN
Status: DISCONTINUED | OUTPATIENT
Start: 2019-07-12 | End: 2019-07-12

## 2019-07-12 RX ORDER — BUPIVACAINE HYDROCHLORIDE 7.5 MG/ML
INJECTION, SOLUTION INTRASPINAL PRN
Status: DISCONTINUED | OUTPATIENT
Start: 2019-07-12 | End: 2019-07-12

## 2019-07-12 RX ORDER — OXYTOCIN/0.9 % SODIUM CHLORIDE 30/500 ML
340 PLASTIC BAG, INJECTION (ML) INTRAVENOUS CONTINUOUS PRN
Status: DISCONTINUED | OUTPATIENT
Start: 2019-07-12 | End: 2019-07-15 | Stop reason: HOSPADM

## 2019-07-12 RX ORDER — CEFAZOLIN SODIUM 2 G/100ML
2 INJECTION, SOLUTION INTRAVENOUS
Status: COMPLETED | OUTPATIENT
Start: 2019-07-12 | End: 2019-07-12

## 2019-07-12 RX ORDER — ONDANSETRON 2 MG/ML
INJECTION INTRAMUSCULAR; INTRAVENOUS PRN
Status: DISCONTINUED | OUTPATIENT
Start: 2019-07-12 | End: 2019-07-12

## 2019-07-12 RX ORDER — ACETAMINOPHEN 325 MG/1
975 TABLET ORAL ONCE
Status: DISCONTINUED | OUTPATIENT
Start: 2019-07-12 | End: 2019-07-12

## 2019-07-12 RX ORDER — LIDOCAINE 40 MG/G
CREAM TOPICAL
Status: DISCONTINUED | OUTPATIENT
Start: 2019-07-12 | End: 2019-07-12

## 2019-07-12 RX ORDER — CITRIC ACID/SODIUM CITRATE 334-500MG
30 SOLUTION, ORAL ORAL ONCE
Status: DISCONTINUED | OUTPATIENT
Start: 2019-07-12 | End: 2019-07-12

## 2019-07-12 RX ORDER — OXYTOCIN 10 [USP'U]/ML
10 INJECTION, SOLUTION INTRAMUSCULAR; INTRAVENOUS
Status: DISCONTINUED | OUTPATIENT
Start: 2019-07-12 | End: 2019-07-15 | Stop reason: HOSPADM

## 2019-07-12 RX ORDER — AMOXICILLIN 250 MG
2 CAPSULE ORAL 2 TIMES DAILY PRN
Status: DISCONTINUED | OUTPATIENT
Start: 2019-07-12 | End: 2019-07-15 | Stop reason: HOSPADM

## 2019-07-12 RX ORDER — AMOXICILLIN 250 MG
1 CAPSULE ORAL 2 TIMES DAILY PRN
Status: DISCONTINUED | OUTPATIENT
Start: 2019-07-12 | End: 2019-07-15 | Stop reason: HOSPADM

## 2019-07-12 RX ORDER — MORPHINE SULFATE 1 MG/ML
100 INJECTION, SOLUTION EPIDURAL; INTRATHECAL; INTRAVENOUS ONCE
Status: DISCONTINUED | OUTPATIENT
Start: 2019-07-12 | End: 2019-07-12

## 2019-07-12 RX ORDER — FLUMAZENIL 0.1 MG/ML
0.2 INJECTION, SOLUTION INTRAVENOUS
Status: DISCONTINUED | OUTPATIENT
Start: 2019-07-12 | End: 2019-07-12

## 2019-07-12 RX ORDER — NALOXONE HYDROCHLORIDE 0.4 MG/ML
.1-.4 INJECTION, SOLUTION INTRAMUSCULAR; INTRAVENOUS; SUBCUTANEOUS
Status: ACTIVE | OUTPATIENT
Start: 2019-07-12 | End: 2019-07-13

## 2019-07-12 RX ORDER — CEFAZOLIN SODIUM 1 G/3ML
1 INJECTION, POWDER, FOR SOLUTION INTRAMUSCULAR; INTRAVENOUS SEE ADMIN INSTRUCTIONS
Status: DISCONTINUED | OUTPATIENT
Start: 2019-07-12 | End: 2019-07-12

## 2019-07-12 RX ORDER — SODIUM CHLORIDE, SODIUM LACTATE, POTASSIUM CHLORIDE, CALCIUM CHLORIDE 600; 310; 30; 20 MG/100ML; MG/100ML; MG/100ML; MG/100ML
INJECTION, SOLUTION INTRAVENOUS
Status: DISCONTINUED
Start: 2019-07-12 | End: 2019-07-12 | Stop reason: HOSPADM

## 2019-07-12 RX ORDER — SODIUM CHLORIDE, SODIUM LACTATE, POTASSIUM CHLORIDE, CALCIUM CHLORIDE 600; 310; 30; 20 MG/100ML; MG/100ML; MG/100ML; MG/100ML
INJECTION, SOLUTION INTRAVENOUS CONTINUOUS
Status: DISCONTINUED | OUTPATIENT
Start: 2019-07-12 | End: 2019-07-12

## 2019-07-12 RX ORDER — DIPHENHYDRAMINE HYDROCHLORIDE 50 MG/ML
25 INJECTION INTRAMUSCULAR; INTRAVENOUS EVERY 6 HOURS PRN
Status: DISCONTINUED | OUTPATIENT
Start: 2019-07-12 | End: 2019-07-15 | Stop reason: HOSPADM

## 2019-07-12 RX ORDER — HYDROMORPHONE HYDROCHLORIDE 1 MG/ML
.3-.5 INJECTION, SOLUTION INTRAMUSCULAR; INTRAVENOUS; SUBCUTANEOUS EVERY 10 MIN PRN
Status: DISCONTINUED | OUTPATIENT
Start: 2019-07-12 | End: 2019-07-12

## 2019-07-12 RX ORDER — LANOLIN 100 %
OINTMENT (GRAM) TOPICAL
Status: DISCONTINUED | OUTPATIENT
Start: 2019-07-12 | End: 2019-07-15 | Stop reason: HOSPADM

## 2019-07-12 RX ORDER — SIMETHICONE 80 MG
80 TABLET,CHEWABLE ORAL 4 TIMES DAILY PRN
Status: DISCONTINUED | OUTPATIENT
Start: 2019-07-12 | End: 2019-07-15 | Stop reason: HOSPADM

## 2019-07-12 RX ORDER — FENTANYL CITRATE 50 UG/ML
INJECTION, SOLUTION INTRAMUSCULAR; INTRAVENOUS PRN
Status: DISCONTINUED | OUTPATIENT
Start: 2019-07-12 | End: 2019-07-12

## 2019-07-12 RX ORDER — NALOXONE HYDROCHLORIDE 0.4 MG/ML
.1-.4 INJECTION, SOLUTION INTRAMUSCULAR; INTRAVENOUS; SUBCUTANEOUS
Status: DISCONTINUED | OUTPATIENT
Start: 2019-07-12 | End: 2019-07-12

## 2019-07-12 RX ORDER — BUPIVACAINE HYDROCHLORIDE 2.5 MG/ML
INJECTION, SOLUTION EPIDURAL; INFILTRATION; INTRACAUDAL PRN
Status: DISCONTINUED | OUTPATIENT
Start: 2019-07-12 | End: 2019-07-12

## 2019-07-12 RX ORDER — DEXTROSE, SODIUM CHLORIDE, SODIUM LACTATE, POTASSIUM CHLORIDE, AND CALCIUM CHLORIDE 5; .6; .31; .03; .02 G/100ML; G/100ML; G/100ML; G/100ML; G/100ML
INJECTION, SOLUTION INTRAVENOUS CONTINUOUS
Status: DISCONTINUED | OUTPATIENT
Start: 2019-07-12 | End: 2019-07-15 | Stop reason: HOSPADM

## 2019-07-12 RX ORDER — OXYTOCIN/0.9 % SODIUM CHLORIDE 30/500 ML
100 PLASTIC BAG, INJECTION (ML) INTRAVENOUS CONTINUOUS
Status: DISCONTINUED | OUTPATIENT
Start: 2019-07-12 | End: 2019-07-15 | Stop reason: HOSPADM

## 2019-07-12 RX ORDER — ACETAMINOPHEN 325 MG/1
975 TABLET ORAL EVERY 8 HOURS
Status: COMPLETED | OUTPATIENT
Start: 2019-07-12 | End: 2019-07-15

## 2019-07-12 RX ORDER — LIDOCAINE 40 MG/G
CREAM TOPICAL
Status: DISCONTINUED | OUTPATIENT
Start: 2019-07-12 | End: 2019-07-15 | Stop reason: HOSPADM

## 2019-07-12 RX ORDER — CITRIC ACID/SODIUM CITRATE 334-500MG
30 SOLUTION, ORAL ORAL
Status: COMPLETED | OUTPATIENT
Start: 2019-07-12 | End: 2019-07-12

## 2019-07-12 RX ORDER — FENTANYL CITRATE 50 UG/ML
25-50 INJECTION, SOLUTION INTRAMUSCULAR; INTRAVENOUS
Status: DISCONTINUED | OUTPATIENT
Start: 2019-07-12 | End: 2019-07-12 | Stop reason: HOSPADM

## 2019-07-12 RX ORDER — IBUPROFEN 800 MG/1
800 TABLET, FILM COATED ORAL EVERY 6 HOURS PRN
Status: DISCONTINUED | OUTPATIENT
Start: 2019-07-12 | End: 2019-07-15 | Stop reason: HOSPADM

## 2019-07-12 RX ORDER — DIPHENHYDRAMINE HCL 25 MG
25 CAPSULE ORAL EVERY 6 HOURS PRN
Status: DISCONTINUED | OUTPATIENT
Start: 2019-07-12 | End: 2019-07-15 | Stop reason: HOSPADM

## 2019-07-12 RX ORDER — MISOPROSTOL 200 UG/1
800 TABLET ORAL
Status: DISCONTINUED | OUTPATIENT
Start: 2019-07-12 | End: 2019-07-15 | Stop reason: HOSPADM

## 2019-07-12 RX ORDER — ONDANSETRON 4 MG/1
4 TABLET, ORALLY DISINTEGRATING ORAL EVERY 30 MIN PRN
Status: DISCONTINUED | OUTPATIENT
Start: 2019-07-12 | End: 2019-07-12

## 2019-07-12 RX ORDER — FENTANYL CITRATE 50 UG/ML
25-50 INJECTION, SOLUTION INTRAMUSCULAR; INTRAVENOUS
Status: DISCONTINUED | OUTPATIENT
Start: 2019-07-12 | End: 2019-07-12

## 2019-07-12 RX ORDER — KETOROLAC TROMETHAMINE 30 MG/ML
30 INJECTION, SOLUTION INTRAMUSCULAR; INTRAVENOUS EVERY 6 HOURS
Status: COMPLETED | OUTPATIENT
Start: 2019-07-12 | End: 2019-07-13

## 2019-07-12 RX ORDER — HYDROCORTISONE 2.5 %
CREAM (GRAM) TOPICAL 3 TIMES DAILY PRN
Status: DISCONTINUED | OUTPATIENT
Start: 2019-07-12 | End: 2019-07-15 | Stop reason: HOSPADM

## 2019-07-12 RX ORDER — SODIUM CHLORIDE, SODIUM LACTATE, POTASSIUM CHLORIDE, CALCIUM CHLORIDE 600; 310; 30; 20 MG/100ML; MG/100ML; MG/100ML; MG/100ML
INJECTION, SOLUTION INTRAVENOUS CONTINUOUS PRN
Status: DISCONTINUED | OUTPATIENT
Start: 2019-07-12 | End: 2019-07-12

## 2019-07-12 RX ORDER — OXYCODONE HYDROCHLORIDE 5 MG/1
5-10 TABLET ORAL
Status: DISCONTINUED | OUTPATIENT
Start: 2019-07-12 | End: 2019-07-15 | Stop reason: HOSPADM

## 2019-07-12 RX ORDER — EPHEDRINE SULFATE 50 MG/ML
INJECTION, SOLUTION INTRAVENOUS PRN
Status: DISCONTINUED | OUTPATIENT
Start: 2019-07-12 | End: 2019-07-12

## 2019-07-12 RX ORDER — SODIUM CHLORIDE, SODIUM LACTATE, POTASSIUM CHLORIDE, CALCIUM CHLORIDE 600; 310; 30; 20 MG/100ML; MG/100ML; MG/100ML; MG/100ML
INJECTION, SOLUTION INTRAVENOUS CONTINUOUS
Status: DISCONTINUED | OUTPATIENT
Start: 2019-07-12 | End: 2019-07-12 | Stop reason: HOSPADM

## 2019-07-12 RX ORDER — ONDANSETRON 2 MG/ML
4 INJECTION INTRAMUSCULAR; INTRAVENOUS EVERY 6 HOURS PRN
Status: DISCONTINUED | OUTPATIENT
Start: 2019-07-12 | End: 2019-07-15 | Stop reason: HOSPADM

## 2019-07-12 RX ORDER — LABETALOL 20 MG/4 ML (5 MG/ML) INTRAVENOUS SYRINGE
10
Status: DISCONTINUED | OUTPATIENT
Start: 2019-07-12 | End: 2019-07-12 | Stop reason: HOSPADM

## 2019-07-12 RX ORDER — NALBUPHINE HYDROCHLORIDE 10 MG/ML
2.5-5 INJECTION, SOLUTION INTRAMUSCULAR; INTRAVENOUS; SUBCUTANEOUS EVERY 6 HOURS PRN
Status: DISCONTINUED | OUTPATIENT
Start: 2019-07-12 | End: 2019-07-12

## 2019-07-12 RX ORDER — MEPERIDINE HYDROCHLORIDE 25 MG/ML
12.5 INJECTION INTRAMUSCULAR; INTRAVENOUS; SUBCUTANEOUS
Status: DISCONTINUED | OUTPATIENT
Start: 2019-07-12 | End: 2019-07-12

## 2019-07-12 RX ORDER — ACETAMINOPHEN 325 MG/1
650 TABLET ORAL EVERY 4 HOURS PRN
Status: DISCONTINUED | OUTPATIENT
Start: 2019-07-15 | End: 2019-07-15 | Stop reason: HOSPADM

## 2019-07-12 RX ORDER — KETOROLAC TROMETHAMINE 30 MG/ML
INJECTION, SOLUTION INTRAMUSCULAR; INTRAVENOUS PRN
Status: DISCONTINUED | OUTPATIENT
Start: 2019-07-12 | End: 2019-07-12

## 2019-07-12 RX ORDER — PHENYLEPHRINE HCL IN 0.9% NACL 1 MG/10 ML
SYRINGE (ML) INTRAVENOUS CONTINUOUS PRN
Status: DISCONTINUED | OUTPATIENT
Start: 2019-07-12 | End: 2019-07-12

## 2019-07-12 RX ADMIN — EPHEDRINE SULFATE 10 MG: 50 INJECTION, SOLUTION INTRAVENOUS at 10:43

## 2019-07-12 RX ADMIN — BUPIVACAINE HYDROCHLORIDE 20 ML: 2.5 INJECTION, SOLUTION EPIDURAL; INFILTRATION; INTRACAUDAL at 12:19

## 2019-07-12 RX ADMIN — SODIUM CHLORIDE, POTASSIUM CHLORIDE, SODIUM LACTATE AND CALCIUM CHLORIDE 1000 ML: 600; 310; 30; 20 INJECTION, SOLUTION INTRAVENOUS at 08:22

## 2019-07-12 RX ADMIN — Medication 50 MCG/MIN: at 10:38

## 2019-07-12 RX ADMIN — ONDANSETRON 4 MG: 2 INJECTION INTRAMUSCULAR; INTRAVENOUS at 11:03

## 2019-07-12 RX ADMIN — KETOROLAC TROMETHAMINE 30 MG: 30 INJECTION, SOLUTION INTRAMUSCULAR at 11:57

## 2019-07-12 RX ADMIN — Medication 100 ML/HR: at 14:11

## 2019-07-12 RX ADMIN — ACETAMINOPHEN 975 MG: 325 TABLET, FILM COATED ORAL at 22:30

## 2019-07-12 RX ADMIN — FENTANYL CITRATE 10 MCG: 50 INJECTION, SOLUTION INTRAMUSCULAR; INTRAVENOUS at 10:38

## 2019-07-12 RX ADMIN — BUPIVACAINE 20 ML: 13.3 INJECTION, SUSPENSION, LIPOSOMAL INFILTRATION at 12:18

## 2019-07-12 RX ADMIN — OXYCODONE HYDROCHLORIDE 5 MG: 5 TABLET ORAL at 22:33

## 2019-07-12 RX ADMIN — OXYTOCIN-SODIUM CHLORIDE 0.9% IV SOLN 30 UNIT/500ML 600 ML/HR: 30-0.9/5 SOLUTION at 11:03

## 2019-07-12 RX ADMIN — NALBUPHINE HYDROCHLORIDE 2.5 MG: 10 INJECTION, SOLUTION INTRAMUSCULAR; INTRAVENOUS; SUBCUTANEOUS at 13:43

## 2019-07-12 RX ADMIN — MORPHINE SULFATE 0.1 MG: 1 INJECTION, SOLUTION EPIDURAL; INTRATHECAL; INTRAVENOUS at 10:38

## 2019-07-12 RX ADMIN — KETOROLAC TROMETHAMINE 30 MG: 30 INJECTION, SOLUTION INTRAMUSCULAR at 18:13

## 2019-07-12 RX ADMIN — SODIUM CHLORIDE, POTASSIUM CHLORIDE, SODIUM LACTATE AND CALCIUM CHLORIDE: 600; 310; 30; 20 INJECTION, SOLUTION INTRAVENOUS at 10:26

## 2019-07-12 RX ADMIN — BUPIVACAINE HYDROCHLORIDE IN DEXTROSE 1.5 ML: 7.5 INJECTION, SOLUTION SUBARACHNOID at 10:38

## 2019-07-12 RX ADMIN — SENNOSIDES AND DOCUSATE SODIUM 1 TABLET: 8.6; 5 TABLET ORAL at 22:30

## 2019-07-12 RX ADMIN — CEFAZOLIN SODIUM 2 G: 2 INJECTION, SOLUTION INTRAVENOUS at 10:46

## 2019-07-12 RX ADMIN — SODIUM CITRATE AND CITRIC ACID MONOHYDRATE 30 ML: 500; 334 SOLUTION ORAL at 10:08

## 2019-07-12 RX ADMIN — ACETAMINOPHEN 975 MG: 325 TABLET, FILM COATED ORAL at 15:32

## 2019-07-12 NOTE — ANESTHESIA PROCEDURE NOTES
Spinal/LP Procedure Note    Spinal Block  Staff:     Anesthesiologist:  Triny Bedolla MD    Resident/CRNA:  Juvenal López DO    Spinal/LP performed by resident/CRNA in presence of a teaching physician.    Location: In OR BEFORE Induction  Procedure Start/Stop Times:     patient identified, IV checked, site marked, risks and benefits discussed, informed consent, monitors and equipment checked, pre-op evaluation, at physician/surgeon's request and post-op pain management      Correct Patient: Yes      Correct Position: Yes      Correct Site: Yes      Correct Procedure: Yes      Correct Laterality:  Yes    Site Marked:  Yes  Procedure:     Procedure:  Intrathecal    ASA:  2    Position:  Sitting    Sterile Prep: chloraprep      Insertion site:  L4-5    Approach:  Midline    Local Skin Infiltration:  1% lidocaine    amount (ml):  3    Introducer used: Yes      Introducer gauge:  20 G    Attempts:  1    Redirects:  1    CSF:  Clear    Paresthesias:  No

## 2019-07-12 NOTE — ANESTHESIA POSTPROCEDURE EVALUATION
Anesthesia POST Procedure Evaluation    Patient: Nunu Trimble   MRN:     [id number] Gender:   female   Age:    29 year old :      1990        Preoperative Diagnosis: Fetal Congenital Heart Disease   Procedure(s):  Primary Ceserean Section   Postop Comments: No value filed.       Anesthesia Type:  Regional  Spinal, Peripheral Nerve Block for post-op pain at surgeon's request    Reportable Event: NO     PAIN: Uncomplicated   Sign Out status: Comfortable, Well controlled pain     PONV: No PONV   Sign Out status:  No Nausea or Vomiting     Neuro/Psych: Uneventful perioperative course   Sign Out Status: Preoperative baseline; Age appropriate mentation     Airway/Resp.: Uneventful perioperative course   Sign Out Status: Non labored breathing, age appropriate RR; Resp. Status within EXPECTED Parameters     CV: Uneventful perioperative course   Sign Out status: Appropriate BP and perfusion indices; Appropriate HR/Rhythm     Disposition:   Sign Out in:  PACU  Disposition:  Phase II; Home  Recovery Course: Uneventful  Follow-Up: Not required           Last Anesthesia Record Vitals:  CRNA VITALS  2019 1125 - 2019 1225      2019             Pulse:  68    Ht Rate:  65    SpO2:  100 %          Last PACU Vitals:  Vitals Value Taken Time   /74 2019  2:04 PM   Temp 37.2  C (99  F) 2019  1:00 PM   Pulse 78 2019  2:00 PM   Resp 20 2019  2:04 PM   SpO2 98 % 2019  2:05 PM   Temp src     NIBP     Pulse     SpO2     Resp     Temp     Ht Rate     Temp 2     Vitals shown include unvalidated device data.      Electronically Signed By: Triny Bedolla MD, 2019, 2:06 PM

## 2019-07-12 NOTE — H&P
[facility]  OB History and Physical      Nunu Trimble MRN# [id number]   Age: 29 year old YOB: 1990     HPI:  Ms. Nunu Trimble is a 29 year old  at 39w0d by 7w5d US, who presents for primary  section for velamentous cord insertion with funic presentation of the cord. She has been having slight cramping, not painful or consistent. No vaginal bleeding, and loss of fluid.   + normal fetal movement.    Pregnancy Complications:  -  Inferior velamentous cord insertion with funic presentation on 7/3 US   - insertion 3.6 cm from os, fetal vessel courses from inferior aspect of placenta across membranes to anterior portion of the placenta  - Fetus w/ aortic hypoplasia, possible coarctation   - Syncopal episode in third trimester, work up at [facility]    Prenatal Labs:   Lab Results   Component Value Date    ABO O 2019    RH Pos 2019    AS Neg 2019    HGB 12.5 2019       GBS Status:   Lab Results   Component Value Date    GBS Negative 2019       Ultrasounds  7/3/19  IMPRESSION  ---------------------------------------------------------------------------------------------------------  1) Intrauterine pregnancy at 37 5/7 weeks gestational age.  2) Mild left fetal pyelectasis was again noted. Otherwise, none of the anomalies commonly detected by ultrasound were evident in the limited fetal anatomic survey  described above.  3) Growth parameters and estimated fetal weight were consistent with an appropriate for gestation age pattern of growth.  4) The amniotic fluid volume appeared normal.  5) There is a velamentous cord insertion noted to the maternal right of the cervix, which measures approximately 3.6 cm from the internal os. There is an additional fetal  vessel that courses from the inferior aspect of the posterior placenta, across the fetal membranes to the right of the cervix and then enters into the anterior portion  of the  fetal placenta. This vessel is approximately 5 cm from the internal os.  6) There is an intermittent FUNIC PRESENTATION noted on today's US, as the velamentous cord insertion is caudad to the fetal head.    OB History  OB History    Para Term  AB Living   1 0 0 0 0 0   SAB TAB Ectopic Multiple Live Births   0 0 0 0 0      # Outcome Date GA Lbr Duane/2nd Weight Sex Delivery Anes PTL Lv   1 Current                PMHx:   - Syncope  PSHx:   - Diagnostic laparoscopy for endometriosis     Meds:   Medications Prior to Admission   Medication Sig Dispense Refill Last Dose     MAGNESIUM PO    2019     Omega-3 Fatty Acids (FISH OIL) 500 MG CAPS Take 1 capsule by mouth   2019     Prenatal Vit-Fe Fumarate-FA (PRENATAL VITAMIN PO) Take 1 tablet by mouth daily   2019     ranitidine (ZANTAC) 150 MG tablet Take 150 mg by mouth daily   2019     Allergies:  No Known Allergies   FmHx: No family history on file.  SocHx: She denies any tobacco, alcohol, or other drug use during this pregnancy.    ROS:   Complete 10-point ROS negative except as noted in HPI.She denies headache, blurry vision, chest pain, shortness of breath, RUQ pain, nausea, vomiting, dysuria, hematuria or extremity edema.    PE:  Vit:   Patient Vitals for the past 4 hrs:   BP Temp Temp src Pulse Resp SpO2   19 0743 115/77 -- -- 86 18 --   19 0637 128/84 98.6  F (37  C) Oral -- 16 99 %      Gen: Well-appearing, NAD, comfortable   CV: rrr, no mrg   Pulm: Ctab, no wheezes or crackles   Abd: Soft, gravid, non-tender   Ext: Trace LE edema b/l    Pres:  vtx by BPP US on 7/10  EFW:  7# by Leopold's  Memb: Intact             FHT: Baseline 120, moderate variability, present accelerations, no decelerations   Sarben: Rare       Assessment  Ms. Nunu Trimble is a 29 year old , at 39w0d by 7w5d US, who presents for scheduled primary  section.    Plan  Admit to L&D.    #PLTCS for velamentous cord insertion w/ funic  presentation:  - Due to velamentous cord insertion with vessel tracking through membranes anteriorly, will NOT delay cord clamping of infant after delivery. NICU to be present for delivery   - Ancef, SCDs for ppx  - NPO, IVF  - Routine labs   - Risks of  including bleeding, infection, injury to surrounding structures discussed, patient wishes to proceed. Written consent obtained.     #Fetus w/ aortic hypoplasia, possible coarctation  - NICU present for delivery, plan for peds cards consult and echo following birth     #Syncope in third trimester:   - EKG and echo wnl at [facility], no further events     #FWB: Category I FHT.  Continue EFM and toco  - GBS neg  - EFW 7#, vtx by BPP US on 7/10, placenta posterior    #PNC: Rh neg, Rubella immune, GCT nml    The patient was discussed with Dr. Ocampo who is in agreement with the treatment plan.    Umm Reddy MD  OBGYN PGY-2  7:42 AM 2019    Staff MD Note    I appreciate the note by Dr. Reddy.  Any necessary changes have been made by me.  I evaluated the patient with the resident and agree with the assessment and plan.    Gail Ocampo MD

## 2019-07-12 NOTE — ANESTHESIA PROCEDURE NOTES
Peripheral Nerve Block Procedure Note    Staff:     Anesthesiologist:  Triny Bedolla MD    Resident/CRNA:  Nate Jackson MD    Block performed by resident/CRNA in the presence of a teaching physician    Location: PACU  Procedure Start/Stop TImes:     patient identified, IV checked, site marked, risks and benefits discussed, informed consent, monitors and equipment checked, pre-op evaluation, at physician/surgeon's request and post-op pain management      Correct Patient: Yes      Correct Position: Yes      Correct Site: Yes      Correct Procedure: Yes      Correct Laterality:  N/A    Site Marked:  N/A  Procedure details:     Procedure:  TAP    ASA:  2    Laterality:  Bilateral    Sterile Prep: patient draped, mask and sterile gloves      Needle:  Insulated    Needle gauge:  21    Needle length (inches):  3.13    Complications:  None  Assessment/Narrative:      Routine TAP block after .

## 2019-07-12 NOTE — PLAN OF CARE
Primary C/S at 1102, term male, immediate cord clamping and handoff to NICU team. Apgars 9,9. Placenta sent to pathology. Procedure finished at 1155. Pt wheeled to OB PACU in stable condition at 1202. TAP block by anesthesia team at 1220. Pt assisted to breastpump+ hand express for first time at 1230. Denies any N/V or pain. Received initial dose of Toradol in OR. Mild facial itching, declines nubain currently. Will visit infant in NICU when PACU recovery completed.

## 2019-07-12 NOTE — PLAN OF CARE
Data: Nunu Trimble transferred to [place] [address] via cart  at 1455 after visiting baby in NICU..     Action: Receiving unit notified of transfer: Yes. Patient and family notified of room change. Belongings sent to receiving unit. Accompanied by Registered Nurse. Oriented patient to surroundings. Call light within reach. Response: Patient tolerated transfer and is stable.

## 2019-07-12 NOTE — PLAN OF CARE
Pt fully prepped for scheduled primary C/S with planned NICU admission. NPO since yesterday 2230, IV fluids infusing. Reactive NST. Asking appropriate questions, denies concerns.  present with patient. Awaiting OR availability.

## 2019-07-12 NOTE — DISCHARGE SUMMARY
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  - Fetus w/ aortic hypoplasia, possible coarctation   - Syncopal episode in third trimester          Discharge Diagnosis:     Same, delivered           Procedures:     Procedure(s):  Primary low-transverse  section with two layer uterine closure via Pfannenstiel incision  spinal anesthesia                Medications Prior to Admission:     Medications Prior to Admission   Medication Sig Dispense Refill Last Dose     [DISCONTINUED] MAGNESIUM PO    2019     [DISCONTINUED] Omega-3 Fatty Acids (FISH OIL) 500 MG CAPS Take 1 capsule by mouth   2019     [DISCONTINUED] Prenatal Vit-Fe Fumarate-FA (PRENATAL VITAMIN PO) Take 1 tablet by mouth daily   2019     [DISCONTINUED] ranitidine (ZANTAC) 150 MG tablet Take 150 mg by mouth daily   2019             Discharge Medications:        Review of your medicines      START taking      Dose / Directions   acetaminophen 325 MG tablet  Commonly known as:  TYLENOL      Dose:  650 mg  Take 2 tablets (650 mg) by mouth every 6 hours as needed for mild pain Start after Delivery.  Quantity:  100 tablet  Refills:  0     ibuprofen 600 MG tablet  Commonly known as:  ADVIL/MOTRIN      Dose:  600 mg  Take 1 tablet (600 mg) by mouth every 6 hours as needed for moderate pain Start after delivery  Quantity:  60 tablet  Refills:  0     senna-docusate 8.6-50 MG tablet  Commonly known as:  SENOKOT-S/PERICOLACE      Dose:  1 tablet  Take 1 tablet by mouth daily Start after delivery.  Quantity:  100 tablet  Refills:  0        STOP taking    Fish Oil 500 MG  Caps        MAGNESIUM PO        PRENATAL VITAMIN PO        ranitidine 150 MG tablet  Commonly known as:  ZANTAC              Where to get your medicines      These medications were sent to [place], [place] - [address]  [address] [facility] [address], [facility] [address]    Phone:  [phone]     acetaminophen 325 MG tablet    ibuprofen 600 MG tablet    senna-docusate 8.6-50 MG tablet              Anestheisa        Consultations:           Brief Admission History:   Nunu Trimble is a 29 year old  at 39w0d admitted for scheduled primary  section for velamentous cord insertion with funic presentation.  The risks, benefits, and alternatives of  section were discussed with the patient, and she agreed to proceed.          Intraoperative course   The procedure was uncomplicated.  EBL 1024 mL.  See operative report for details.     Operative findings:   1. No intraabdominal or rectofascial adhesions   2. Clear amniotic fluid   3. Liveborn male infant in VICKIE presentation. Apgars 9 at 1 minute & 9 at 5 minutes. Weight 3040g.  4. Arterial cord pH 7.25, base deficit 5. Venous cord pH 7.29, base deficit 4.6.  5. Normal uterus, fallopian tubes, and ovaries.   6.        Postpartum Course   The patient's hospital course was unremarkable.  She recovered as anticipated and experienced no post-operative complications.  On discharge, her pain was well controlled. Vaginal bleeding is similar to peak menstrual flow.  Voiding without difficulty.  Ambulating well, tolerating a normal diet, and has resumption of bowel function.  No fever or significant wound drainage.  Breastfeeding/pumping well.  Infant is stable. She was discharged on post-partum day #3.    Post-partum hemoglobin:   Hemoglobin   Date Value Ref Range Status   2019 11.0 (L) 11.7 - 15.7 g/dL Final     Contraception:prefers discussion at PP visit  Rh positive, no Rhogam indicated  Rubella immune, no MMR indicated           Discharge Instructions and Follow-Up:     Discharge diet: Regular   Discharge activity: No lifting greater than 20 lbs, pushing, pulling, or other strenuous activity for 6 weeks. Pelvic rest for 6 weeks including no sexual intercourse, tampons, or douching. No driving until you can slam on the breaks without pain or while on narcotic pain medications.    Discharge follow-up: Follow up with primary OB for routine postpartum visit in 6 weeks   Wound care: Keep incision clean and dry           Discharge Disposition:     Discharged to home         Ariella Mcmullen MD  OBGYN Resident, PGY1     The patient was seen and examined by me on the day of discharge.  I have reviewed and agree with the above note.    Yuliya Tinoco MD, FACOG

## 2019-07-12 NOTE — OP NOTE
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  The risks, benefits, and alternatives of  section were discussed with the patient, and she agreed to proceed.     Findings:   1. No intraabdominal or rectofascial adhesions   2. Clear amniotic fluid  3. Liveborn male infant in VICKIE presentation. Apgars 9 at 1 minute & 9 at 5 minutes. Weight pending.  4. Arterial cord pH 7.25, base deficit 5. Venous cord pH 7.29, base deficit 4.6.  5. Normal uterus, fallopian tubes, and ovaries.     Procedure Details:   The patient was brought to the OR, where adequate spinal anesthesia was administered.  She was placed in the dorsal supine position with a slight leftward tilt. She was prepped and draped in the usual sterile fashion. A surgical time out was performed. A pfannenstiel skin incision was made with the scalpel, and carried down to the underlying fascia with sharp and blunt dissection. The fascia was incised in the midline, and the incision was extended laterally with the Reyes scissors. The superior aspect of the fascia was  grasped with the Kocher clamps and dissected off of the underlying rectus muscles with blunt and sharp dissection. Attention was then turned to the inferior aspect of the fascia, which was similarly dissected off of the underlying rectus muscles. The rectus muscles were  in the midline, and the peritoneum was entered bluntly, and the opening was extended with digital pressure. The bladder blade was placed. A transverse hysterotomy was made with the scalpel in the lower uterine segment, and the incision was extended with digital pressure. The infant was noted to be in the VICKIE position, and was delivered atraumatically. The shoulders delivered easily.  No nuchal cord was noted. The cord was doubly clamped and cut immediately, and the infant was handed off to the awaiting nursery staff. A segment of cord was cut and held. The placenta was delivered with gentle traction on the umbilical cord and uterine massage. The uterus was exteriorized and cleared of all clots and debris. Uterine tone was noted to be firm with high dose pitocin given through the running IV and uterine massage.  The hysterotomy was closed with a running locked suture of 0 Vicryl.  The hysterotomy was then imbricated using an 0 Vicryl suture. A small area of bleeding was controlled with a figure of X of 0-Monocryl. The hysterotomy was noted to be hemostatic. The posterior cul-de-sac was cleared of all clots and debris. The uterus was returned to the abdomen. The pericolic gutters were cleared of all clots and debris. The hysterotomy was reexamined and noted to be hemostatic. The fascia and rectus muscles were examined and areas of oozing were controlled with electrocautery. The fascia was closed with a running 0 Vicryl suture. The subcutaneous tissue was irrigated and areas of oozing were controlled with electrocautery. The subcutaneous tissue was greater than 2 cm in thickness, and was therefore closed with a running suture of 3-0 Vicryl.  The skin was closed with 4-0 Monocryl and covered with a sterile dressing.    All sponge, needle, and instrument counts were correct. The patient tolerated the procedure well, and was transferred to recovery in stable condition. Dr. Ocampo was present and scrubbed for the entirety of the procedure.     Umm Reddy  OB/GYN Resident, PGY-2  7/12/2019 10:30 AM      Staff MD Note  I was present and scrubbed for the entire procedure noted above.  I agree with the description above and any necessary changes have been made by me.  Gail Ocampo MD

## 2019-07-12 NOTE — PLAN OF CARE
Pt doing well since admission to [facility] at 1500. Pain well controlled with tylenol and toradol, is aware she can have Oxycodone PRN. Tolerating PO fluids and regular diet without nausea. Second bag of pitocin nearly complete. Output adequate. Has been breastpumping for baby in NICU. Anticipating being up out of bed for first time at 2000 to go visit baby in NICU.

## 2019-07-13 LAB — HGB BLD-MCNC: 11 G/DL (ref 11.7–15.7)

## 2019-07-13 PROCEDURE — 12000001 ZZH R&B MED SURG/OB UMMC

## 2019-07-13 PROCEDURE — 36415 COLL VENOUS BLD VENIPUNCTURE: CPT | Performed by: STUDENT IN AN ORGANIZED HEALTH CARE EDUCATION/TRAINING PROGRAM

## 2019-07-13 PROCEDURE — 85018 HEMOGLOBIN: CPT | Performed by: STUDENT IN AN ORGANIZED HEALTH CARE EDUCATION/TRAINING PROGRAM

## 2019-07-13 PROCEDURE — 25000132 ZZH RX MED GY IP 250 OP 250 PS 637: Performed by: STUDENT IN AN ORGANIZED HEALTH CARE EDUCATION/TRAINING PROGRAM

## 2019-07-13 PROCEDURE — 25000128 H RX IP 250 OP 636: Performed by: STUDENT IN AN ORGANIZED HEALTH CARE EDUCATION/TRAINING PROGRAM

## 2019-07-13 RX ADMIN — IBUPROFEN 800 MG: 800 TABLET ORAL at 17:43

## 2019-07-13 RX ADMIN — KETOROLAC TROMETHAMINE 30 MG: 30 INJECTION, SOLUTION INTRAMUSCULAR at 00:50

## 2019-07-13 RX ADMIN — OXYCODONE HYDROCHLORIDE 5 MG: 5 TABLET ORAL at 16:42

## 2019-07-13 RX ADMIN — OXYCODONE HYDROCHLORIDE 5 MG: 5 TABLET ORAL at 11:10

## 2019-07-13 RX ADMIN — OXYCODONE HYDROCHLORIDE 5 MG: 5 TABLET ORAL at 05:09

## 2019-07-13 RX ADMIN — KETOROLAC TROMETHAMINE 30 MG: 30 INJECTION, SOLUTION INTRAMUSCULAR at 11:14

## 2019-07-13 RX ADMIN — SIMETHICONE CHEW TAB 80 MG 80 MG: 80 TABLET ORAL at 16:57

## 2019-07-13 RX ADMIN — SIMETHICONE CHEW TAB 80 MG 80 MG: 80 TABLET ORAL at 22:47

## 2019-07-13 RX ADMIN — SENNOSIDES AND DOCUSATE SODIUM 2 TABLET: 8.6; 5 TABLET ORAL at 22:47

## 2019-07-13 RX ADMIN — KETOROLAC TROMETHAMINE 30 MG: 30 INJECTION, SOLUTION INTRAMUSCULAR at 06:34

## 2019-07-13 RX ADMIN — ACETAMINOPHEN 975 MG: 325 TABLET, FILM COATED ORAL at 06:33

## 2019-07-13 RX ADMIN — ACETAMINOPHEN 975 MG: 325 TABLET, FILM COATED ORAL at 16:40

## 2019-07-13 NOTE — LACTATION NOTE
"D: Ирина states she is normally in good health, takes no medications, and has no history of breast/chest surgery or trauma.  Her medical record indicates a history of endometriosis. González is her first child. She has already started to pump.   I:  I gave her a folder of introductory materials, reviewed physiology of colostrum and milk production, pumping guidelines, and I gave her a log and encouraged her to use it.  I explained how to access the videos \"Hand Expression\" and \"Maximizing Milk Production\"; as well as other helpful books and websites.  We discussed hands-on pumping techniques and usefulness of a hands-free pumping bra.  We discussed skin to skin holding and how to reach breastfeeding goals.  We talked about medications during breastfeeding.  She verbalized understanding. She has verified pump coverage through her insurance company.    A:  Mom has information she needs to initiate her supply.   P:  Will continue to provide lactation support.  Tamera Xavier, RNC, IBCLC     "

## 2019-07-13 NOTE — PLAN OF CARE
VSS.  Feeling gassy and bloated, given Simethicone. Cramping managed with Tylenol and IBU.  Showered earlier, incision CHAVA.  Pumping for baby in NICU, baby stable and off O2.  Lots of family present this evening.  Continue to monitor.

## 2019-07-13 NOTE — PROGRESS NOTES
[facility]  Obstetrics Postpartum Note    S: Patient doing well today.  Pain controlled with PO meds.  Tolerating regular diet without nausea or vomiting.  Ambulating without dizziness.  Lochia moderate. Denies flatus yet. Voiding spontaneously. Breastfeeding/pumping for infant, oGnzález in NICU (possible coarctation).    O:   Patient Vitals for the past 24 hrs:   BP Temp Temp src Pulse Resp SpO2   19 0800 114/69 98.4  F (36.9  C) Oral 89 16 100 %   19 0500 101/74 98.5  F (36.9  C) Oral 71 16 99 %   19 0050 98/68 98.7  F (37.1  C) Oral 75 16 99 %   19 113/64 98.7  F (37.1  C) Oral 80 18 99 %   19 1815 112/55 -- -- 79 18 98 %   19 1700 99/63 98.8  F (37.1  C) Axillary 72 16 99 %   19 1530 103/65 98.6  F (37  C) Oral 80 16 99 %     I/O last 3 completed shifts:  In: 5007.09 [P.O.:1180; I.V.:2827.09; IV Piggyback:1000]  Out: 3624 [Urine:2600; Blood:1024]    Gen:  Resting comfortably in chair, NAD  CV:  RRR  Pulm:  CTAB, no wheezes  Abd:  Soft, appropriately TTP, non-distended. +BS.  Fundus at umbilicus, firm and non-tender.  Inc: c/d/i, with steris  Ext:  non-tender, trace edema bilaterally    Hgb:   Hemoglobin   Date Value Ref Range Status   2019 11.0 (L) 11.7 - 15.7 g/dL Final   2019 12.5 11.7 - 15.7 g/dL Final       Assessment/Plan:  29 year old  on POD #1 s/p PLTCS for velamentous cord insertion with funic presentation.    PP: Continue with routine postpartum management   Rh positive, Rubella immune   Infant Nutrition: BF/pumping    Contraception: Did not address today  Pain: Continue PO meds (oxycodone, tylenol, ibuprofen)  Heme: Hgb 12.5 > QBL 1024 mL > 11.0  FEN/GI: Tolerating regular diet  :   Voiding spontaneously  PPX: Ambulation for DVT ppx, SCDs while resting in bed    Dispo: Anticipate discharge once meeting postop goals, POD#2-3    Tereza Ovalle MD, MPH  2019, 2:51 PM

## 2019-07-13 NOTE — PROGRESS NOTES
Patients vitals were stable and postpartum assessments WNLs. Oxycodone given for incisional pain with relief. Voiding and using tika-bottle. Ambulated to bathroom independently. Visited NICU with . Showered independently and dressing removed. Incision CDI with no s/sx infection. Pumping independently. Will continue to monitor.

## 2019-07-13 NOTE — PLAN OF CARE
Post partum assessment WNLs. VSS. Incision pain managed with Oxycodone 5 mg PO X 1,along with scheduled Tylenol and Toradol. Dressing CDI. Fundus firm and midline, lochia scant.  Up with assist of 1, tolerating activity. Grullon discontinued at 0510. No void yet. Pumping but only getting a few drops of colostrum so far. PIV patent, saline locked. Good PO intake. Denies nausea, headache, dizziness, chest pain or SOB. Not passing flatus yet.  Appears to be sleeping between assessments. Stable post C section mom. Continue with plan of care.

## 2019-07-13 NOTE — PLAN OF CARE
VSS. Pain well controlled. Visits NICU. Pumping q3-4 hours. SBA. Incision is CDI. Will continue with routine PP cares.

## 2019-07-14 PROCEDURE — 12000001 ZZH R&B MED SURG/OB UMMC

## 2019-07-14 PROCEDURE — 25000132 ZZH RX MED GY IP 250 OP 250 PS 637: Performed by: STUDENT IN AN ORGANIZED HEALTH CARE EDUCATION/TRAINING PROGRAM

## 2019-07-14 RX ADMIN — SENNOSIDES AND DOCUSATE SODIUM 2 TABLET: 8.6; 5 TABLET ORAL at 10:06

## 2019-07-14 RX ADMIN — ACETAMINOPHEN 975 MG: 325 TABLET, FILM COATED ORAL at 01:17

## 2019-07-14 RX ADMIN — IBUPROFEN 800 MG: 800 TABLET ORAL at 13:53

## 2019-07-14 RX ADMIN — OXYCODONE HYDROCHLORIDE 5 MG: 5 TABLET ORAL at 20:45

## 2019-07-14 RX ADMIN — SENNOSIDES AND DOCUSATE SODIUM 2 TABLET: 8.6; 5 TABLET ORAL at 20:43

## 2019-07-14 RX ADMIN — ACETAMINOPHEN 975 MG: 325 TABLET, FILM COATED ORAL at 19:00

## 2019-07-14 RX ADMIN — SIMETHICONE CHEW TAB 80 MG 80 MG: 80 TABLET ORAL at 19:00

## 2019-07-14 RX ADMIN — OXYCODONE HYDROCHLORIDE 5 MG: 5 TABLET ORAL at 10:06

## 2019-07-14 RX ADMIN — IBUPROFEN 800 MG: 800 TABLET ORAL at 01:17

## 2019-07-14 RX ADMIN — OXYCODONE HYDROCHLORIDE 5 MG: 5 TABLET ORAL at 01:16

## 2019-07-14 RX ADMIN — ACETAMINOPHEN 975 MG: 325 TABLET, FILM COATED ORAL at 10:06

## 2019-07-14 RX ADMIN — IBUPROFEN 800 MG: 800 TABLET ORAL at 06:33

## 2019-07-14 RX ADMIN — IBUPROFEN 800 MG: 800 TABLET ORAL at 20:45

## 2019-07-14 NOTE — PROGRESS NOTES
S:  Doing well today, no concerns.  Pain is controlled, ambulating and voiding without difficulty, +flatus.  Baby is stable in the NICU.    O: /77   Pulse 88   Temp 98.3  F (36.8  C) (Oral)   Resp 16   LMP 10/07/2018   SpO2 100%   gen-pleasant, resting  abd-soft, FF, appropriately tender  Inc-c/d/i with ecchymosis right/inferior to incision  extr-NT, 1+ edema armando  Hemoglobin   Date Value Ref Range Status   07/13/2019 11.0 (L) 11.7 - 15.7 g/dL Final     A:  POD #2 s/p primary c/s 2/2 funic presentation, infant with CHD, doing well.    P: Continue routine post op care.  Reviewed likely discharge to home or boarding status tomorrow.    Yuliya Tinoco MD, FACOG

## 2019-07-14 NOTE — PLAN OF CARE
VSS. Postpartum assessment WNL. Incision CHAVA with steri-strips intact. No s/sx of infection at incision site. Reports abdominal/incisional soreness and is taking tylenol and ibuprofen around the clock with oxy PRN with adequate pain control. She is passing gas but has not yet had a BM. She was thinking about doing a suppository but was feeling a little uneasy about it. She does not feel uncomfortable/distended so RN assured her that she did not need to use suppository if she was feeling comfortable. She continues to pump for baby in NICU. She has not been able to collect anything yet. No concerns at this time. Continue plan of care.

## 2019-07-14 NOTE — PLAN OF CARE
"Patient has rested on bed for a good portion of the late evening.  She has declined the need to repeat the Oxycodone when available.  She does still feel a bit \"bloated\".  Ambulation encouraged.  Hydration encouraged.  Fluids provided.  Discussed the availability of a suppository with her.(last BM Thursday, with today being Saturday).  She is considering it.  Senna given at HS.  "

## 2019-07-15 VITALS
OXYGEN SATURATION: 100 % | SYSTOLIC BLOOD PRESSURE: 126 MMHG | TEMPERATURE: 98.6 F | DIASTOLIC BLOOD PRESSURE: 78 MMHG | HEART RATE: 84 BPM | RESPIRATION RATE: 16 BRPM

## 2019-07-15 PROCEDURE — 25000132 ZZH RX MED GY IP 250 OP 250 PS 637: Performed by: STUDENT IN AN ORGANIZED HEALTH CARE EDUCATION/TRAINING PROGRAM

## 2019-07-15 RX ORDER — AMOXICILLIN 250 MG
1 CAPSULE ORAL DAILY
Qty: 100 TABLET | Refills: 0 | Status: SHIPPED | OUTPATIENT
Start: 2019-07-15

## 2019-07-15 RX ORDER — IBUPROFEN 600 MG/1
600 TABLET, FILM COATED ORAL EVERY 6 HOURS PRN
Qty: 60 TABLET | Refills: 0 | Status: SHIPPED | OUTPATIENT
Start: 2019-07-15

## 2019-07-15 RX ORDER — ACETAMINOPHEN 325 MG/1
650 TABLET ORAL EVERY 6 HOURS PRN
Qty: 100 TABLET | Refills: 0 | Status: SHIPPED | OUTPATIENT
Start: 2019-07-15

## 2019-07-15 RX ORDER — OXYCODONE HYDROCHLORIDE 5 MG/1
5 TABLET ORAL EVERY 6 HOURS PRN
Qty: 9 TABLET | Refills: 0 | Status: SHIPPED | OUTPATIENT
Start: 2019-07-15 | End: 2019-07-18

## 2019-07-15 RX ADMIN — OXYCODONE HYDROCHLORIDE 5 MG: 5 TABLET ORAL at 04:41

## 2019-07-15 RX ADMIN — IBUPROFEN 800 MG: 800 TABLET ORAL at 03:09

## 2019-07-15 RX ADMIN — OXYCODONE HYDROCHLORIDE 5 MG: 5 TABLET ORAL at 12:07

## 2019-07-15 RX ADMIN — SIMETHICONE CHEW TAB 80 MG 80 MG: 80 TABLET ORAL at 14:23

## 2019-07-15 RX ADMIN — IBUPROFEN 800 MG: 800 TABLET ORAL at 09:11

## 2019-07-15 RX ADMIN — ACETAMINOPHEN 975 MG: 325 TABLET, FILM COATED ORAL at 03:09

## 2019-07-15 RX ADMIN — SENNOSIDES AND DOCUSATE SODIUM 2 TABLET: 8.6; 5 TABLET ORAL at 09:11

## 2019-07-15 RX ADMIN — ACETAMINOPHEN 975 MG: 325 TABLET, FILM COATED ORAL at 12:07

## 2019-07-15 NOTE — PLAN OF CARE
Patient doing very well. Up ad pineda. VS and full assessment WDL. Going to feed baby in NICU and pumping prn. Pain controlled with prn pain meds.  at bedside and very supportive. Plan for discharge tomorrow.

## 2019-07-15 NOTE — PROGRESS NOTES
"[facility]  Obstetrics Postpartum Note    S: Patient doing well today.  Pain controlled with PO meds.  Tolerating regular diet without nausea or vomiting.  Ambulating without dizziness. Lochia moderate. Has passed flatus, had small bowel movement this AM. Voiding spontaneously. Breastfeeding/pumping for infant, Baby \"[place]\" in NICU (possible coarctation).    O:   Patient Vitals for the past 24 hrs:   BP Temp Temp src Pulse Resp   07/15/19 0748 126/78 98.6  F (37  C) Oral 84 16   07/15/19 0309 121/78 98.8  F (37.1  C) Oral 87 18   19 1900 120/80 98.1  F (36.7  C) Oral 85 16     No intake/output data recorded.    Gen:  Resting comfortably in bed, NAD  CV:  Normal rate, well perfused  Pulm:  Breathing comfortably on room air  Abd:  Soft, appropriately TTP, non-distended. +BS. Fundus at umbilicus, firm and non-tender.  Inc: c/d/i, with steris  Ext:  non-tender, trace edema bilaterally    Hgb:   Hemoglobin   Date Value Ref Range Status   2019 11.0 (L) 11.7 - 15.7 g/dL Final   2019 12.5 11.7 - 15.7 g/dL Final       Assessment/Plan:  29 year old  on POD #3 s/p PLTCS for velamentous cord insertion with funic presentation.     PP: Continue with routine postpartum management    Rh positive, Rubella immune   Infant Nutrition: BF/pumping    Contraception: Did not address today  Pain: Continue PO meds (oxycodone, tylenol, ibuprofen)  Heme: Hgb 12.5 > QBL 1024 mL > 11.0  FEN/GI: Tolerating regular diet, had BM this morning  :   Voiding spontaneously  PPX: Ambulation for DVT ppx, SCDs while resting in bed    Dispo: Planning for discharge today    Ariella Mcmullen MD  OBGYN Resident, PGY1  07/15/19 6:35 AM    The patient was seen and examined by me separately from the team.  I have reviewed and agree with the above note.  She is feeling well today, ready for discharge.  She plans to follow up with her primary OB at [facility] for her postpartum care, has an appointment " already scheduled in 2 weeks.  Discharge today as above.    Yuliya Tinoco MD, FACOG

## 2019-07-15 NOTE — PROVIDER NOTIFICATION
Pt wants oxycodone ordered for discharge med in case she needs it. Can you order in d/c pharmacy as pt will board here toneliseo. Text paged Dr. Tinoco as no residents here.  Will do after finished in OR.

## 2019-07-15 NOTE — PROGRESS NOTES
Visit with patient and spouse to assess needs and to offer support related to baby's NICU admission.      Psychosocial assessment completed and details can be found in baby's chart in NICU.      Will continue to follow along throughout baby's NICU admission for needs and for support.

## 2019-07-15 NOTE — PLAN OF CARE
VSS. Afebrile. Up ad pineda. Ambulating often to NICU. Tolerating regular diet. Pumping and going down to breast feed. FOB here this morning and supportive. Discharge instructions  and meds reviewed and given to pt. Breast pump given by NICU lactation. C/o some pain and medicated with relief. Voiding and passing gas. Discharged today but boarding in room d/t baby in NICU. Still awaiting oxycodone discharge meds to be ordered by Dr. Tinoco and sent to discharge pharmacy. Pt discharged.

## 2019-07-15 NOTE — PROGRESS NOTES
Post  Anesthesia Follow Up Note    Patient: Nunu Trimble    Patient location: Postpartum floor.    Chief complaint: Acute postoperative pain managment    Procedure(s) Performed:   SECTION    Anesthesia type: Spinal Block and transversus abdominus plane (TAP) nerve block        Subjective:   The patient reports good pain control.  Pain Intensity: 2/10.  Patient reports mild pruritus, denies weakness, paresthesia.  Denies numbness, tingling lips, tinnitus, metallic taste, difficulties breathing nausea, vomiting, or difficulty voiding. She is able to ambulate and tolerates regular diet.        Objective:  Respiratory Function (RR / SpO2 / Airway Patency): Satisfactory    Cardiac Function (HR / Rhythm / BP): Satisfactory    Strength and sensation lower extremities: Strength 5/5 and grossly symmetric bilateral LE    Site of spinal/epidural insertion: clean and intact, no tenderness, swelling or erythema    Last Vitals: /78   Pulse 87   Temp 37.1  C (98.8  F) (Oral)   Resp 18   LMP 10/07/2018   SpO2 100%   Breastfeeding? Unknown       Assessment and Plan:   Nunu Trimble is a 29 year old female POD #1 s/p   SECTION with spinal 0.1mg morphine, 10mcg fentanyl IT and single shot TAP nerve block injections bupivacaine 0.75% with epinephrine 1:200,000 1.5 mL, then liposome bupivacaine (Exparel) long-acting 1.3% 20mL given on 2019 for postoperative analgesia.  Pt is ambulating with no difficulty, no weakness or paresthesias.  no evidence of adverse side effects associated with spinal and nerve block injections. Pt is receiving adequate incisional pain control.  Anticipate up to 72 hours of incisional pain control.  Anticipate patient will require opioid/nonopioid analgesics for visceral and muscle pain not controlled with local anesthetic.      - NO other local anesthetic use within 96 hours of liposome bupivacaine (Exparel) long acting  - patient received verbal and written  instructions about liposome bupivacaine   - please call if questions or concerns  - discussed plan with attending anesthesiologist      Nate Jackson M.D.  [facility]  7/15/2019 6:51 AM    If questions or concerns, please contact OB Anesthesiologist  Phone [phone]

## 2019-07-15 NOTE — PLAN OF CARE
VSS. Postpartum assessment WNL. Incision CHAVA and shows no s/sx of infection - steri-strips remain intact. Reports some incisional pain which is well controlled with tylenol, ibuprofen and oxycodone PRN. She is pumping and has been discouraged because she still hasn't been able to collect any colostrum. Encouraged patient to continue to pump 8-12x and reminded her that she needs to pump to stimulate her milk supply. She visits baby frequently in NICU but slept for a  few hours in between feedings last night. She still has not had a BM. She reports some gas but states that she doesn't feel like she needs to poop. Noted hypoactive bowel sounds during assessment. No acute concerns at this time. Continue plan of care.

## 2019-07-15 NOTE — PROVIDER NOTIFICATION
Pt already discharged but boarding here. Please write for d/c med oxycodone for pt to discharge pharmacy and she will  tomorrow before she leaves.  Text paged and updated, resident g2 or Dr. Tinoco paged again.

## 2019-07-17 LAB — COPATH REPORT: NORMAL

## 2019-08-26 ENCOUNTER — DOCUMENTATION ONLY (OUTPATIENT)
Dept: OBGYN | Facility: CLINIC | Age: 29
End: 2019-08-26

## 2019-08-26 NOTE — PROGRESS NOTES
[facility] will be sharing updates with you on Maternal Child Health Referral requests for home care services.  This is for care coordination purposes and alert you to referral status.  We received the referral for  Nunu Trimble; MRN [id number] and want to update you:      [facility] has made two attempts to contact patient by phone and text message over the last four days.  We have not had any response from patient.  Final message was left advising patient to follow up with Primary Care Providers for mom and baby.  Ordering MD and Primary Care Providers for mom and baby notified.    Sincerely [facility]  Stalin Reyna  [phone]

## 2019-09-09 ENCOUNTER — DOCUMENTATION ONLY (OUTPATIENT)
Dept: OBGYN | Facility: CLINIC | Age: 29
End: 2019-09-09

## 2019-09-09 NOTE — PROGRESS NOTES
[facility] will be sharing updates with you on Maternal Child Health Referral requests for home care services.  This is for care coordination purposes and alert you to referral status.  We received the referral for  Nunu Trimble; MRN [id number] and want to update you:      [facility] is unable to see patient for postpartum/ assessment and education due to patient's mothers insurance OUT OF SERVICE AREA is not contracted with [facility] for this service. Patient to contact their insurance provider to determine if service is covered through another homecare agency. Referral source IF STILL INPATIENT, ordering MD, and Primary Care Providers for mom and baby notified via EPIC ENCOUNTER OR CALL.        Sincerely [facility]  Stalin Reyna  [phone]

## 2019-11-25 LAB — INTERPRETATION ECG - MUSE: NORMAL

## 2020-03-11 ENCOUNTER — HEALTH MAINTENANCE LETTER (OUTPATIENT)
Age: 30
End: 2020-03-11

## 2021-01-03 ENCOUNTER — HEALTH MAINTENANCE LETTER (OUTPATIENT)
Age: 31
End: 2021-01-03

## 2021-04-25 ENCOUNTER — HEALTH MAINTENANCE LETTER (OUTPATIENT)
Age: 31
End: 2021-04-25

## 2021-10-10 ENCOUNTER — HEALTH MAINTENANCE LETTER (OUTPATIENT)
Age: 31
End: 2021-10-10

## 2022-04-14 PROCEDURE — 88305 TISSUE EXAM BY PATHOLOGIST: CPT | Performed by: PATHOLOGY

## 2022-04-15 ENCOUNTER — LAB REQUISITION (OUTPATIENT)
Dept: LAB | Facility: CLINIC | Age: 32
End: 2022-04-15

## 2022-04-15 DIAGNOSIS — K64.4 RESIDUAL HEMORRHOIDAL SKIN TAGS: ICD-10-CM

## 2022-04-15 DIAGNOSIS — R93.3 ABNORMAL FINDINGS ON DIAGNOSTIC IMAGING OF OTHER PARTS OF DIGESTIVE TRACT: ICD-10-CM

## 2022-04-15 DIAGNOSIS — R10.30 LOWER ABDOMINAL PAIN, UNSPECIFIED: ICD-10-CM

## 2022-04-15 DIAGNOSIS — K92.1 MELENA: ICD-10-CM

## 2022-04-20 LAB
PATH REPORT.COMMENTS IMP SPEC: NORMAL
PATH REPORT.COMMENTS IMP SPEC: NORMAL
PATH REPORT.FINAL DX SPEC: NORMAL
PATH REPORT.GROSS SPEC: NORMAL
PATH REPORT.MICROSCOPIC SPEC OTHER STN: NORMAL
PATH REPORT.RELEVANT HX SPEC: NORMAL
PHOTO IMAGE: NORMAL

## 2022-05-21 ENCOUNTER — HEALTH MAINTENANCE LETTER (OUTPATIENT)
Age: 32
End: 2022-05-21

## 2022-09-18 ENCOUNTER — HEALTH MAINTENANCE LETTER (OUTPATIENT)
Age: 32
End: 2022-09-18

## 2022-11-28 ENCOUNTER — LAB REQUISITION (OUTPATIENT)
Dept: LAB | Facility: CLINIC | Age: 32
End: 2022-11-28

## 2022-11-28 DIAGNOSIS — Z36.85 ENCOUNTER FOR ANTENATAL SCREENING FOR STREPTOCOCCUS B: ICD-10-CM

## 2022-11-28 PROCEDURE — 87653 STREP B DNA AMP PROBE: CPT | Performed by: OBSTETRICS & GYNECOLOGY

## 2022-11-29 LAB — GP B STREP DNA SPEC QL NAA+PROBE: NEGATIVE

## 2023-06-04 ENCOUNTER — HEALTH MAINTENANCE LETTER (OUTPATIENT)
Age: 33
End: 2023-06-04

## 2024-01-31 ENCOUNTER — LAB REQUISITION (OUTPATIENT)
Dept: LAB | Facility: CLINIC | Age: 34
End: 2024-01-31

## 2024-01-31 DIAGNOSIS — N76.0 ACUTE VAGINITIS: ICD-10-CM

## 2024-01-31 DIAGNOSIS — N32.81 OVERACTIVE BLADDER: ICD-10-CM

## 2024-01-31 LAB
BACTERIAL VAGINOSIS VAG-IMP: NEGATIVE
CANDIDA DNA VAG QL NAA+PROBE: NOT DETECTED
CANDIDA GLABRATA / CANDIDA KRUSEI DNA: NOT DETECTED
T VAGINALIS DNA VAG QL NAA+PROBE: NOT DETECTED

## 2024-01-31 PROCEDURE — 0352U MULTIPLEX VAGINAL PANEL BY PCR: CPT | Performed by: OBSTETRICS & GYNECOLOGY

## 2024-01-31 PROCEDURE — 87086 URINE CULTURE/COLONY COUNT: CPT | Performed by: OBSTETRICS & GYNECOLOGY

## 2024-02-01 LAB — BACTERIA UR CULT: NORMAL

## 2024-04-09 ENCOUNTER — LAB REQUISITION (OUTPATIENT)
Dept: LAB | Facility: CLINIC | Age: 34
End: 2024-04-09

## 2024-04-09 DIAGNOSIS — R39.9 UNSPECIFIED SYMPTOMS AND SIGNS INVOLVING THE GENITOURINARY SYSTEM: ICD-10-CM

## 2024-04-09 PROCEDURE — 87086 URINE CULTURE/COLONY COUNT: CPT | Performed by: FAMILY MEDICINE

## 2024-04-10 LAB — BACTERIA UR CULT: ABNORMAL

## 2024-07-09 ENCOUNTER — LAB REQUISITION (OUTPATIENT)
Dept: LAB | Facility: CLINIC | Age: 34
End: 2024-07-09

## 2024-07-09 DIAGNOSIS — Z13.1 ENCOUNTER FOR SCREENING FOR DIABETES MELLITUS: ICD-10-CM

## 2024-07-09 LAB — HBA1C MFR BLD: 5.9 %

## 2024-07-09 PROCEDURE — 83036 HEMOGLOBIN GLYCOSYLATED A1C: CPT | Performed by: FAMILY MEDICINE

## 2024-09-04 ENCOUNTER — LAB REQUISITION (OUTPATIENT)
Dept: LAB | Facility: CLINIC | Age: 34
End: 2024-09-04

## 2024-09-04 DIAGNOSIS — N39.3 STRESS INCONTINENCE (FEMALE) (MALE): ICD-10-CM

## 2024-09-04 PROCEDURE — 87086 URINE CULTURE/COLONY COUNT: CPT | Performed by: OBSTETRICS & GYNECOLOGY

## 2024-09-06 LAB — BACTERIA UR CULT: NO GROWTH

## 2024-09-16 ENCOUNTER — LAB REQUISITION (OUTPATIENT)
Dept: LAB | Facility: CLINIC | Age: 34
End: 2024-09-16

## 2024-09-16 DIAGNOSIS — R73.03 PREDIABETES: ICD-10-CM

## 2024-09-16 LAB — HBA1C MFR BLD: 5.9 %

## 2024-09-16 PROCEDURE — 83036 HEMOGLOBIN GLYCOSYLATED A1C: CPT | Performed by: FAMILY MEDICINE

## 2024-12-16 ENCOUNTER — LAB REQUISITION (OUTPATIENT)
Dept: LAB | Facility: CLINIC | Age: 34
End: 2024-12-16

## 2024-12-16 DIAGNOSIS — R73.03 PREDIABETES: ICD-10-CM

## 2024-12-16 LAB
EST. AVERAGE GLUCOSE BLD GHB EST-MCNC: 126 MG/DL
HBA1C MFR BLD: 6 %

## 2024-12-16 PROCEDURE — 83036 HEMOGLOBIN GLYCOSYLATED A1C: CPT | Performed by: FAMILY MEDICINE

## 2025-04-14 ENCOUNTER — LAB REQUISITION (OUTPATIENT)
Dept: LAB | Facility: CLINIC | Age: 35
End: 2025-04-14

## 2025-04-14 DIAGNOSIS — R73.03 PREDIABETES: ICD-10-CM

## 2025-04-14 LAB
EST. AVERAGE GLUCOSE BLD GHB EST-MCNC: 114 MG/DL
HBA1C MFR BLD: 5.6 %

## 2025-04-14 PROCEDURE — 83036 HEMOGLOBIN GLYCOSYLATED A1C: CPT | Performed by: FAMILY MEDICINE

## 2025-06-28 ENCOUNTER — HEALTH MAINTENANCE LETTER (OUTPATIENT)
Age: 35
End: 2025-06-28

## 2025-08-11 ENCOUNTER — LAB REQUISITION (OUTPATIENT)
Dept: LAB | Facility: CLINIC | Age: 35
End: 2025-08-11

## 2025-08-11 DIAGNOSIS — R53.83 OTHER FATIGUE: ICD-10-CM

## 2025-08-11 LAB
ERYTHROCYTE [DISTWIDTH] IN BLOOD BY AUTOMATED COUNT: 12.9 % (ref 10–15)
HCT VFR BLD AUTO: 41.4 % (ref 35–47)
HGB BLD-MCNC: 13.6 G/DL (ref 11.7–15.7)
MCH RBC QN AUTO: 29.8 PG (ref 26.5–33)
MCHC RBC AUTO-ENTMCNC: 32.9 G/DL (ref 31.5–36.5)
MCV RBC AUTO: 91 FL (ref 78–100)
PLATELET # BLD AUTO: 398 10E3/UL (ref 150–450)
RBC # BLD AUTO: 4.57 10E6/UL (ref 3.8–5.2)
WBC # BLD AUTO: 7.7 10E3/UL (ref 4–11)

## 2025-08-11 PROCEDURE — 82728 ASSAY OF FERRITIN: CPT | Performed by: OBSTETRICS & GYNECOLOGY

## 2025-08-11 PROCEDURE — 82040 ASSAY OF SERUM ALBUMIN: CPT | Performed by: OBSTETRICS & GYNECOLOGY

## 2025-08-11 PROCEDURE — 84443 ASSAY THYROID STIM HORMONE: CPT | Performed by: OBSTETRICS & GYNECOLOGY

## 2025-08-11 PROCEDURE — 85049 AUTOMATED PLATELET COUNT: CPT | Performed by: OBSTETRICS & GYNECOLOGY

## 2025-08-12 LAB
ALBUMIN SERPL BCG-MCNC: 4.2 G/DL (ref 3.5–5.2)
ALP SERPL-CCNC: 95 U/L (ref 40–150)
ALT SERPL W P-5'-P-CCNC: 18 U/L (ref 0–50)
ANION GAP SERPL CALCULATED.3IONS-SCNC: 10 MMOL/L (ref 7–15)
AST SERPL W P-5'-P-CCNC: 23 U/L (ref 0–45)
BILIRUB SERPL-MCNC: 0.4 MG/DL
BUN SERPL-MCNC: 12.9 MG/DL (ref 6–20)
CALCIUM SERPL-MCNC: 9.1 MG/DL (ref 8.8–10.4)
CHLORIDE SERPL-SCNC: 102 MMOL/L (ref 98–107)
CREAT SERPL-MCNC: 0.73 MG/DL (ref 0.51–0.95)
EGFRCR SERPLBLD CKD-EPI 2021: >90 ML/MIN/1.73M2
FERRITIN SERPL-MCNC: 84 NG/ML (ref 6–175)
GLUCOSE SERPL-MCNC: 123 MG/DL (ref 70–99)
HCO3 SERPL-SCNC: 25 MMOL/L (ref 22–29)
POTASSIUM SERPL-SCNC: 3.9 MMOL/L (ref 3.4–5.3)
PROT SERPL-MCNC: 7.2 G/DL (ref 6.4–8.3)
SODIUM SERPL-SCNC: 137 MMOL/L (ref 135–145)
TSH SERPL DL<=0.005 MIU/L-ACNC: 1.51 UIU/ML (ref 0.3–4.2)

## (undated) DEVICE — PACK C-SECTION LF PL15OTA83B

## (undated) DEVICE — SU VICRYL 3-0 CTX 36" UND J980H

## (undated) DEVICE — SOL NACL 0.9% IRRIG 1000ML BOTTLE 07138-09

## (undated) DEVICE — GLOVE ESTEEM POWDER FREE SMT 6.5  2D72PT65

## (undated) DEVICE — SU VICRYL 0 CT-1 36" J346H

## (undated) DEVICE — PREP CHLORAPREP 26ML TINTED ORANGE  260815

## (undated) DEVICE — ESU GROUND PAD UNIVERSAL W/O CORD

## (undated) DEVICE — BASIN SET MAJOR

## (undated) DEVICE — STRAP KNEE/BODY 31143004

## (undated) DEVICE — GLOVE PROTEXIS BLUE W/NEU-THERA 6.5  2D73EB65

## (undated) DEVICE — CATH TRAY FOLEY 16FR BARDEX W/DRAIN BAG STATLOCK 300316A

## (undated) DEVICE — DRAPE SETUP C-SECTION INVISISHIELD 54X90" DYNJE5600

## (undated) DEVICE — SU MONOCRYL 4-0 PS-2 18" UND Y496G

## (undated) DEVICE — SUCTION CANISTER MEDIVAC LINER 1500ML W/LID 65651-515

## (undated) DEVICE — STOCKING SLEEVE COMPRESSION CALF LG

## (undated) DEVICE — SOL WATER IRRIG 1000ML BOTTLE 07139-09

## (undated) RX ORDER — MORPHINE SULFATE 1 MG/ML
INJECTION, SOLUTION EPIDURAL; INTRATHECAL; INTRAVENOUS
Status: DISPENSED
Start: 2019-07-12

## (undated) RX ORDER — NALBUPHINE HYDROCHLORIDE 10 MG/ML
INJECTION, SOLUTION INTRAMUSCULAR; INTRAVENOUS; SUBCUTANEOUS
Status: DISPENSED
Start: 2019-07-12

## (undated) RX ORDER — OXYTOCIN/0.9 % SODIUM CHLORIDE 30/500 ML
PLASTIC BAG, INJECTION (ML) INTRAVENOUS
Status: DISPENSED
Start: 2019-07-12

## (undated) RX ORDER — FENTANYL CITRATE 50 UG/ML
INJECTION, SOLUTION INTRAMUSCULAR; INTRAVENOUS
Status: DISPENSED
Start: 2019-07-12